# Patient Record
Sex: FEMALE | Race: WHITE | NOT HISPANIC OR LATINO | Employment: FULL TIME | URBAN - METROPOLITAN AREA
[De-identification: names, ages, dates, MRNs, and addresses within clinical notes are randomized per-mention and may not be internally consistent; named-entity substitution may affect disease eponyms.]

---

## 2017-06-08 ENCOUNTER — HOSPITAL ENCOUNTER (EMERGENCY)
Facility: HOSPITAL | Age: 22
Discharge: HOME | End: 2017-06-08
Attending: EMERGENCY MEDICINE | Admitting: EMERGENCY MEDICINE
Payer: COMMERCIAL

## 2017-06-08 VITALS
RESPIRATION RATE: 18 BRPM | HEART RATE: 70 BPM | WEIGHT: 185 LBS | OXYGEN SATURATION: 98 % | TEMPERATURE: 98.2 F | DIASTOLIC BLOOD PRESSURE: 79 MMHG | SYSTOLIC BLOOD PRESSURE: 111 MMHG
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
## 2017-06-08 DIAGNOSIS — M54.50 LOW BACK PAIN: Primary | ICD-10-CM

## 2017-06-08 PROCEDURE — 99284 EMERGENCY DEPT VISIT MOD MDM: CPT

## 2017-09-25 ENCOUNTER — ALLSCRIPTS OFFICE VISIT (OUTPATIENT)
Dept: OTHER | Facility: OTHER | Age: 22
End: 2017-09-25

## 2017-10-27 ENCOUNTER — ALLSCRIPTS OFFICE VISIT (OUTPATIENT)
Dept: OTHER | Facility: OTHER | Age: 22
End: 2017-10-27

## 2017-10-27 DIAGNOSIS — N92.6 IRREGULAR MENSTRUATION: ICD-10-CM

## 2017-10-29 LAB
CHLAMYDIA TRACHOMATIS BY MOL. METHOD (HISTORICAL): NOT DETECTED
GC BY MOL. METHOD (HISTORICAL): NOT DETECTED

## 2017-11-22 ENCOUNTER — APPOINTMENT (OUTPATIENT)
Dept: LAB | Facility: MEDICAL CENTER | Age: 22
End: 2017-11-22
Payer: COMMERCIAL

## 2017-11-22 ENCOUNTER — TRANSCRIBE ORDERS (OUTPATIENT)
Dept: ADMINISTRATIVE | Facility: HOSPITAL | Age: 22
End: 2017-11-22

## 2017-11-22 DIAGNOSIS — Z02.83 EMPLOYMENT-RELATED DRUG TESTING, ENCOUNTER FOR: Primary | ICD-10-CM

## 2017-11-22 DIAGNOSIS — Z02.83 EMPLOYMENT-RELATED DRUG TESTING, ENCOUNTER FOR: ICD-10-CM

## 2017-11-22 DIAGNOSIS — N92.6 IRREGULAR MENSTRUATION: ICD-10-CM

## 2017-11-22 LAB
GLUCOSE P FAST SERPL-MCNC: 75 MG/DL (ref 65–99)
TSH SERPL DL<=0.05 MIU/L-ACNC: 2.12 UIU/ML (ref 0.46–3.98)

## 2017-11-22 PROCEDURE — 84443 ASSAY THYROID STIM HORMONE: CPT

## 2017-11-22 PROCEDURE — 82947 ASSAY GLUCOSE BLOOD QUANT: CPT

## 2017-11-22 PROCEDURE — 36415 COLL VENOUS BLD VENIPUNCTURE: CPT

## 2017-11-22 PROCEDURE — 86480 TB TEST CELL IMMUN MEASURE: CPT

## 2017-11-25 LAB
ANNOTATION COMMENT IMP: NORMAL
GAMMA INTERFERON BACKGROUND BLD IA-ACNC: 0.05 IU/ML
M TB IFN-G BLD-IMP: NEGATIVE
M TB IFN-G CD4+ BCKGRND COR BLD-ACNC: 0 IU/ML
M TB IFN-G CD4+ T-CELLS BLD-ACNC: 0.05 IU/ML
MITOGEN IGNF BLD-ACNC: >10 IU/ML
QUANTIFERON-TB GOLD IN TUBE: NORMAL
SERVICE CMNT-IMP: NORMAL

## 2017-11-29 ENCOUNTER — HOSPITAL ENCOUNTER (OUTPATIENT)
Dept: RADIOLOGY | Facility: MEDICAL CENTER | Age: 22
Discharge: HOME | End: 2017-11-29
Payer: COMMERCIAL

## 2017-11-29 DIAGNOSIS — N92.6 IRREGULAR MENSTRUATION: ICD-10-CM

## 2017-11-29 PROCEDURE — 76830 TRANSVAGINAL US NON-OB: CPT

## 2017-11-29 PROCEDURE — 76856 US EXAM PELVIC COMPLETE: CPT

## 2018-01-03 ENCOUNTER — APPOINTMENT (OUTPATIENT)
Dept: LAB | Facility: MEDICAL CENTER | Age: 23
End: 2018-01-03
Attending: PHYSICIAN ASSISTANT
Payer: COMMERCIAL

## 2018-01-03 ENCOUNTER — TRANSCRIBE ORDERS (OUTPATIENT)
Dept: URGENT CARE | Facility: MEDICAL CENTER | Age: 23
End: 2018-01-03

## 2018-01-03 DIAGNOSIS — Z00.8 SPECIAL EXAM: ICD-10-CM

## 2018-01-03 DIAGNOSIS — Z00.8 SPECIAL EXAM: Primary | ICD-10-CM

## 2018-01-03 DIAGNOSIS — Z00.00 PHYSICAL EXAM: Primary | ICD-10-CM

## 2018-01-03 DIAGNOSIS — Z00.00 PHYSICAL EXAM: ICD-10-CM

## 2018-01-03 PROCEDURE — 36415 COLL VENOUS BLD VENIPUNCTURE: CPT

## 2018-01-03 PROCEDURE — 86787 VARICELLA-ZOSTER ANTIBODY: CPT

## 2018-01-04 LAB — VZV IGG SER IA-ACNC: NORMAL

## 2018-01-12 VITALS
BODY MASS INDEX: 33.15 KG/M2 | SYSTOLIC BLOOD PRESSURE: 122 MMHG | WEIGHT: 199 LBS | DIASTOLIC BLOOD PRESSURE: 68 MMHG | HEIGHT: 65 IN

## 2018-01-14 VITALS
SYSTOLIC BLOOD PRESSURE: 118 MMHG | BODY MASS INDEX: 33.36 KG/M2 | HEIGHT: 65 IN | DIASTOLIC BLOOD PRESSURE: 72 MMHG | WEIGHT: 200.2 LBS

## 2018-01-21 ENCOUNTER — GENERIC CONVERSION - ENCOUNTER (OUTPATIENT)
Dept: OTHER | Facility: OTHER | Age: 23
End: 2018-01-21

## 2018-01-24 NOTE — MISCELLANEOUS
Message  Msg left on pt cell that labs and u/s wnl   Will f/u at appt 3/18      Signatures   Electronically signed by : Kim Caro DO; Jan 21 2018 11:16AM EST                       (Author)

## 2018-10-19 ENCOUNTER — TRANSCRIBE ORDERS (OUTPATIENT)
Dept: ADMINISTRATIVE | Facility: HOSPITAL | Age: 23
End: 2018-10-19

## 2018-10-19 ENCOUNTER — APPOINTMENT (OUTPATIENT)
Dept: LAB | Facility: MEDICAL CENTER | Age: 23
End: 2018-10-19
Payer: COMMERCIAL

## 2018-10-19 ENCOUNTER — ANNUAL EXAM (OUTPATIENT)
Dept: OBGYN CLINIC | Facility: MEDICAL CENTER | Age: 23
End: 2018-10-19
Payer: COMMERCIAL

## 2018-10-19 VITALS
HEIGHT: 65 IN | DIASTOLIC BLOOD PRESSURE: 62 MMHG | BODY MASS INDEX: 33.89 KG/M2 | WEIGHT: 203.4 LBS | SYSTOLIC BLOOD PRESSURE: 98 MMHG

## 2018-10-19 DIAGNOSIS — D64.9 ANEMIA, UNSPECIFIED TYPE: Primary | ICD-10-CM

## 2018-10-19 DIAGNOSIS — Z11.3 SCREENING FOR STD (SEXUALLY TRANSMITTED DISEASE): ICD-10-CM

## 2018-10-19 DIAGNOSIS — Z02.1 PRE-EMPLOYMENT EXAMINATION: ICD-10-CM

## 2018-10-19 DIAGNOSIS — Z01.419 ENCOUNTER FOR GYNECOLOGICAL EXAMINATION (GENERAL) (ROUTINE) WITHOUT ABNORMAL FINDINGS: Primary | ICD-10-CM

## 2018-10-19 DIAGNOSIS — Z12.4 CERVICAL CANCER SCREENING: ICD-10-CM

## 2018-10-19 LAB — 25(OH)D3 SERPL-MCNC: 26.6 NG/ML (ref 30–100)

## 2018-10-19 PROCEDURE — 87491 CHLMYD TRACH DNA AMP PROBE: CPT | Performed by: OBSTETRICS & GYNECOLOGY

## 2018-10-19 PROCEDURE — 36415 COLL VENOUS BLD VENIPUNCTURE: CPT | Performed by: PEDIATRICS

## 2018-10-19 PROCEDURE — G0145 SCR C/V CYTO,THINLAYER,RESCR: HCPCS | Performed by: OBSTETRICS & GYNECOLOGY

## 2018-10-19 PROCEDURE — 87591 N.GONORRHOEAE DNA AMP PROB: CPT | Performed by: OBSTETRICS & GYNECOLOGY

## 2018-10-19 PROCEDURE — 86480 TB TEST CELL IMMUN MEASURE: CPT | Performed by: PEDIATRICS

## 2018-10-19 PROCEDURE — 82306 VITAMIN D 25 HYDROXY: CPT | Performed by: PEDIATRICS

## 2018-10-19 PROCEDURE — 99395 PREV VISIT EST AGE 18-39: CPT | Performed by: OBSTETRICS & GYNECOLOGY

## 2018-10-19 NOTE — PROGRESS NOTES
Assessment/Plan:    No problem-specific Assessment & Plan notes found for this encounter  Diagnoses and all orders for this visit:    Encounter for gynecological examination (general) (routine) without abnormal findings  -     Liquid-based pap, screening  -     Chlamydia/GC amplified DNA by PCR    Cervical cancer screening  -     Liquid-based pap, screening    Screening for STD (sexually transmitted disease)  -     Chlamydia/GC amplified DNA by PCR        Annual examination was completed  Pap was obtained  GC Chlamydia cultures were obtained after patient consented  We did discuss continuation of condoms for STI prevention as well as pregnancy prevention  Patient to return to the office in 1 year as necessary  She was cautioned to call if she goes greater than 4 months without menses  Subjective:      Patient ID: Shelley Padgett is a 26 y o  female  Patient returns for annual gyn visit  She has no new medical surgical issues  She continues with menses that are every 2-3 months and light in flow  She remains with same partner for the last 5 years  She uses condoms for contraception  She is completing some of her studies at [employer] and is hoping to get into the [facility] program       Gynecologic Exam         The following portions of the patient's history were reviewed and updated as appropriate:   She  has no past medical history on file  She   Patient Active Problem List    Diagnosis Date Noted    Encounter for gynecological examination (general) (routine) without abnormal findings 10/19/2018    Cervical cancer screening 10/19/2018    Screening for STD (sexually transmitted disease) 10/19/2018     She  has a past surgical history that includes Mouth surgery (Bilateral)  Her family history includes Anemia in her family; Breast cancer in her mother; Heart disease in her family; Other in her family  She  reports that she has never smoked   She has never used smokeless tobacco  She reports that she does not drink alcohol or use drugs  No current outpatient prescriptions on file  No current facility-administered medications for this visit  No current outpatient prescriptions on file prior to visit  No current facility-administered medications on file prior to visit  She is allergic to penicillins       Review of Systems   All other systems reviewed and are negative  Objective:      BP 98/62 (BP Location: Left arm, Patient Position: Sitting, Cuff Size: Standard)   Ht 5' 4 5" (1 638 m)   Wt 92 3 kg (203 lb 6 4 oz)   LMP 09/15/2018 (Exact Date)   BMI 34 37 kg/m²          Physical Exam   Constitutional: She is oriented to person, place, and time  She appears well-developed and well-nourished  HENT:   Head: Normocephalic and atraumatic  Nose: Nose normal    Eyes: Pupils are equal, round, and reactive to light  EOM are normal    Neck: Normal range of motion  Neck supple  No thyromegaly present  Cardiovascular: Normal rate and regular rhythm  Pulmonary/Chest: Effort normal and breath sounds normal  No respiratory distress  Breasts no masses, tenderness, skin changes   Abdominal: Soft  Bowel sounds are normal  She exhibits no distension and no mass  There is no tenderness  Hernia confirmed negative in the right inguinal area and confirmed negative in the left inguinal area  Genitourinary: Vagina normal and uterus normal  No breast swelling, tenderness, discharge or bleeding  Pelvic exam was performed with patient supine  No labial fusion  There is no rash, tenderness, lesion or injury on the right labia  There is no rash, tenderness, lesion or injury on the left labia  Cervix exhibits no motion tenderness, no discharge and no friability     Genitourinary Comments: Ext genitalia nl female w/o lesions  Vag healthy without lesions or discharge  Cervix healthy w/o lesions or discharge  uterus nl size, NT, no mass  Adnexa NT, no mass Musculoskeletal: Normal range of motion  She exhibits no edema  Lymphadenopathy:        Right: No inguinal adenopathy present  Left: No inguinal adenopathy present  Neurological: She is alert and oriented to person, place, and time  She has normal reflexes  Skin: Skin is warm and dry  No rash noted  Psychiatric: She has a normal mood and affect  Her behavior is normal  Thought content normal    Nursing note and vitals reviewed

## 2018-10-22 LAB
CHLAMYDIA DNA CVX QL NAA+PROBE: NORMAL
GAMMA INTERFERON BACKGROUND BLD IA-ACNC: 0.06 IU/ML
M TB IFN-G BLD-IMP: NEGATIVE
M TB IFN-G CD4+ BCKGRND COR BLD-ACNC: -0.01 IU/ML
M TB IFN-G CD4+ BCKGRND COR BLD-ACNC: -0.03 IU/ML
MITOGEN IGNF BCKGRD COR BLD-ACNC: >10 IU/ML
N GONORRHOEA DNA GENITAL QL NAA+PROBE: NORMAL

## 2018-10-23 ENCOUNTER — TELEPHONE (OUTPATIENT)
Dept: OBGYN CLINIC | Facility: CLINIC | Age: 23
End: 2018-10-23

## 2018-10-23 NOTE — TELEPHONE ENCOUNTER
----- Message from Dickson Dutton DO sent at 10/23/2018  6:07 PM EDT -----  Results within normal limits  Please inform patient

## 2018-10-26 LAB
LAB AP GYN PRIMARY INTERPRETATION: NORMAL
LAB AP LMP: NORMAL
Lab: NORMAL

## 2019-04-01 ENCOUNTER — TRANSCRIBE ORDERS (OUTPATIENT)
Dept: ADMINISTRATIVE | Facility: HOSPITAL | Age: 24
End: 2019-04-01

## 2019-04-01 DIAGNOSIS — M54.2 NECK PAIN: Primary | ICD-10-CM

## 2019-04-01 DIAGNOSIS — R42 DIZZINESS: ICD-10-CM

## 2019-04-02 ENCOUNTER — TRANSCRIBE ORDERS (OUTPATIENT)
Dept: ADMINISTRATIVE | Facility: HOSPITAL | Age: 24
End: 2019-04-02

## 2019-04-02 ENCOUNTER — APPOINTMENT (OUTPATIENT)
Dept: LAB | Facility: MEDICAL CENTER | Age: 24
End: 2019-04-02
Payer: COMMERCIAL

## 2019-04-02 DIAGNOSIS — E78.5 HYPERLIPIDEMIA, UNSPECIFIED HYPERLIPIDEMIA TYPE: ICD-10-CM

## 2019-04-02 DIAGNOSIS — E55.9 VITAMIN D DEFICIENCY: ICD-10-CM

## 2019-04-02 DIAGNOSIS — D64.9 ANEMIA, UNSPECIFIED TYPE: ICD-10-CM

## 2019-04-02 DIAGNOSIS — D64.9 ANEMIA, UNSPECIFIED TYPE: Primary | ICD-10-CM

## 2019-04-02 LAB
ALBUMIN SERPL BCP-MCNC: 4.4 G/DL (ref 3.5–5)
ALP SERPL-CCNC: 59 U/L (ref 46–116)
ALT SERPL W P-5'-P-CCNC: 21 U/L (ref 12–78)
ANION GAP SERPL CALCULATED.3IONS-SCNC: 4 MMOL/L (ref 4–13)
AST SERPL W P-5'-P-CCNC: 16 U/L (ref 5–45)
BILIRUB SERPL-MCNC: 0.65 MG/DL (ref 0.2–1)
BUN SERPL-MCNC: 16 MG/DL (ref 5–25)
CALCIUM SERPL-MCNC: 9.4 MG/DL (ref 8.3–10.1)
CHLORIDE SERPL-SCNC: 106 MMOL/L (ref 100–108)
CHOLEST SERPL-MCNC: 144 MG/DL (ref 50–200)
CO2 SERPL-SCNC: 28 MMOL/L (ref 21–32)
CREAT SERPL-MCNC: 0.95 MG/DL (ref 0.6–1.3)
EST. AVERAGE GLUCOSE BLD GHB EST-MCNC: 105 MG/DL
GFR SERPL CREATININE-BSD FRML MDRD: 86 ML/MIN/1.73SQ M
GLUCOSE P FAST SERPL-MCNC: 83 MG/DL (ref 65–99)
HBA1C MFR BLD: 5.3 % (ref 4.2–6.3)
HDLC SERPL-MCNC: 47 MG/DL (ref 40–60)
LDLC SERPL CALC-MCNC: 84 MG/DL (ref 0–100)
NONHDLC SERPL-MCNC: 97 MG/DL
POTASSIUM SERPL-SCNC: 4.2 MMOL/L (ref 3.5–5.3)
PROT SERPL-MCNC: 7.6 G/DL (ref 6.4–8.2)
SODIUM SERPL-SCNC: 138 MMOL/L (ref 136–145)
TIBC SERPL-MCNC: 409 UG/DL (ref 250–450)
TRIGL SERPL-MCNC: 65 MG/DL
TSH SERPL DL<=0.05 MIU/L-ACNC: 2.22 UIU/ML (ref 0.36–3.74)
VIT B12 SERPL-MCNC: 314 PG/ML (ref 100–900)

## 2019-04-02 PROCEDURE — 36415 COLL VENOUS BLD VENIPUNCTURE: CPT | Performed by: PEDIATRICS

## 2019-04-02 PROCEDURE — 83036 HEMOGLOBIN GLYCOSYLATED A1C: CPT | Performed by: PEDIATRICS

## 2019-04-02 PROCEDURE — 84443 ASSAY THYROID STIM HORMONE: CPT | Performed by: PEDIATRICS

## 2019-04-02 PROCEDURE — 82607 VITAMIN B-12: CPT | Performed by: PEDIATRICS

## 2019-04-02 PROCEDURE — 80053 COMPREHEN METABOLIC PANEL: CPT | Performed by: PEDIATRICS

## 2019-04-02 PROCEDURE — 80061 LIPID PANEL: CPT | Performed by: PEDIATRICS

## 2019-04-02 PROCEDURE — 86376 MICROSOMAL ANTIBODY EACH: CPT

## 2019-04-02 PROCEDURE — 83550 IRON BINDING TEST: CPT

## 2019-04-03 LAB — THYROPEROXIDASE AB SERPL-ACNC: 9 IU/ML (ref 0–34)

## 2019-04-07 ENCOUNTER — HOSPITAL ENCOUNTER (OUTPATIENT)
Dept: RADIOLOGY | Facility: HOSPITAL | Age: 24
Discharge: HOME | End: 2019-04-07
Payer: COMMERCIAL

## 2019-04-07 DIAGNOSIS — R42 DIZZINESS: ICD-10-CM

## 2019-04-07 DIAGNOSIS — M54.2 NECK PAIN: ICD-10-CM

## 2019-04-07 PROCEDURE — 72141 MRI NECK SPINE W/O DYE: CPT

## 2019-07-19 ENCOUNTER — HOSPITAL ENCOUNTER (EMERGENCY)
Facility: HOSPITAL | Age: 24
Discharge: HOME | End: 2019-07-19
Attending: EMERGENCY MEDICINE | Admitting: EMERGENCY MEDICINE
Payer: OTHER MISCELLANEOUS

## 2019-07-19 VITALS
OXYGEN SATURATION: 98 % | BODY MASS INDEX: 32.11 KG/M2 | DIASTOLIC BLOOD PRESSURE: 71 MMHG | WEIGHT: 190 LBS | RESPIRATION RATE: 16 BRPM | SYSTOLIC BLOOD PRESSURE: 124 MMHG | HEART RATE: 72 BPM

## 2019-07-19 DIAGNOSIS — M79.604 RIGHT LEG PAIN: Primary | ICD-10-CM

## 2019-07-19 PROCEDURE — 99283 EMERGENCY DEPT VISIT LOW MDM: CPT

## 2019-07-19 PROCEDURE — 99281 EMR DPT VST MAYX REQ PHY/QHP: CPT | Performed by: PHYSICIAN ASSISTANT

## 2019-07-19 RX ORDER — IBUPROFEN 600 MG/1
TABLET ORAL EVERY 6 HOURS PRN
COMMUNITY
End: 2020-10-27 | Stop reason: ALTCHOICE

## 2019-07-19 NOTE — ED PROVIDER NOTES
History  Chief Complaint   Patient presents with    Leg Pain     Pt reports running after a patient this am while at work and feeling pain in her upper right leg  Pt denies numbness/tingling/radiating pain  Pt reports she took ibuprofen for pain relief at approx 82      82-year-old female with no significant past history presents for evaluation of right-sided hamstring pain that started earlier today  Patient reports that she was running after a patient in states that when she ran she noticed a pulled the back of her leg  Patient states that she took ibuprofen earlier today with relief  Currently reports that she has pain when she stands  Does not have pain with palpation  Denies any swelling, fall, injury  Has not fracture injured this in the past   Denies any paresthesias or pain radiating down her leg  Prior to Admission Medications   Prescriptions Last Dose Informant Patient Reported? Taking?   ibuprofen (MOTRIN) 600 mg tablet 7/19/2019 at 1000 Self Yes Yes   Sig: Take by mouth every 6 (six) hours as needed for mild pain      Facility-Administered Medications: None       History reviewed  No pertinent past medical history  Past Surgical History:   Procedure Laterality Date    MOUTH SURGERY Bilateral     B[place] teeth at age 5       Family History   Problem Relation Age of Onset    Breast cancer Mother     Anemia Family     Other Family         Blood Dyscrasia    Heart disease Family         Cardiac Disorder     I have reviewed and agree with the history as documented  Social History     Tobacco Use    Smoking status: Never Smoker    Smokeless tobacco: Never Used   Substance Use Topics    Alcohol use: No    Drug use: No        Review of Systems   Constitutional: Negative for chills and fever  Gastrointestinal: Negative for nausea and vomiting  Musculoskeletal: Positive for myalgias  Negative for arthralgias  Skin: Negative for color change, pallor, rash and wound  Neurological: Negative for weakness and numbness  Physical Exam  Physical Exam   Constitutional: She appears well-developed and well-nourished  No distress  Musculoskeletal: Normal range of motion  She exhibits tenderness  She exhibits no edema or deformity  Right upper leg: She exhibits tenderness  She exhibits no bony tenderness, no swelling, no edema, no deformity and no laceration  Legs:  Full range of motion of lower extremities bilaterally, neurovascular intact  Neurological: She is alert  Skin: Skin is warm  Capillary refill takes less than 2 seconds  No rash noted  She is not diaphoretic  No erythema  Psychiatric: She has a normal mood and affect  Vital Signs  ED Triage Vitals [07/19/19 1525]   Temp Pulse Respirations Blood Pressure SpO2   -- 72 16 124/71 98 %      Temp src Heart Rate Source Patient Position - Orthostatic VS BP Location FiO2 (%)   -- Monitor Sitting Right arm --      Pain Score       3           Vitals:    07/19/19 1525   BP: 124/71   Pulse: 72   Patient Position - Orthostatic VS: Sitting         Visual Acuity      ED Medications  Medications - No data to display    Diagnostic Studies  Results Reviewed     None                 No orders to display              Procedures  Procedures       ED Course                               MDM  Number of Diagnoses or Management Options  Right leg pain:   Diagnosis management comments: 70-year-old female presents for evaluation of right-sided leg pain  Patient reports that over time pain has subsided and is improving  Will advise ice packs, anti-inflammatories for pain control        Disposition  Final diagnoses:   Right leg pain     Time reflects when diagnosis was documented in both MDM as applicable and the Disposition within this note     Time User Action Codes Description Comment    7/19/2019  4:08 PM Fabián Billings Add [M79 [id number]] Right leg pain       ED Disposition     ED Disposition Condition Date/Time Comment Discharge Stable Fri Jul 19, 2019  4:08 PM Germaine Mensah discharge to home/self care  Follow-up Information     Follow up With Specialties Details Why Contact Info    Keely Lara, DO Pediatrics  As needed [address] [facility]  [address] [phone]          Discharge Medication List as of 7/19/2019  4:09 PM      CONTINUE these medications which have NOT CHANGED    Details   ibuprofen (MOTRIN) 600 mg tablet Take by mouth every 6 (six) hours as needed for mild pain, Historical Med           No discharge procedures on file      ED Provider  Electronically Signed by           Ronel Joshi PA-C  07/19/19 1973

## 2019-10-25 ENCOUNTER — ANNUAL EXAM (OUTPATIENT)
Dept: OBGYN CLINIC | Facility: MEDICAL CENTER | Age: 24
End: 2019-10-25
Payer: COMMERCIAL

## 2019-10-25 VITALS
DIASTOLIC BLOOD PRESSURE: 92 MMHG | SYSTOLIC BLOOD PRESSURE: 115 MMHG | WEIGHT: 203.2 LBS | HEIGHT: 65 IN | BODY MASS INDEX: 33.85 KG/M2

## 2019-10-25 DIAGNOSIS — Z01.419 ENCOUNTER FOR GYNECOLOGICAL EXAMINATION (GENERAL) (ROUTINE) WITHOUT ABNORMAL FINDINGS: Primary | ICD-10-CM

## 2019-10-25 DIAGNOSIS — N94.6 DYSMENORRHEA: ICD-10-CM

## 2019-10-25 PROCEDURE — 99395 PREV VISIT EST AGE 18-39: CPT | Performed by: OBSTETRICS & GYNECOLOGY

## 2019-10-25 RX ORDER — NORETHINDRONE ACETATE AND ETHINYL ESTRADIOL AND FERROUS FUMARATE 1MG-20(24)
1 KIT ORAL DAILY
Qty: 84 TABLET | Refills: 3 | Status: SHIPPED | OUTPATIENT
Start: 2019-10-25 | End: 2020-10-27 | Stop reason: SDUPTHER

## 2019-10-25 NOTE — PROGRESS NOTES
Assessment/Plan:    No problem-specific Assessment & Plan notes found for this encounter  Diagnoses and all orders for this visit:    Encounter for gynecological examination (general) (routine) without abnormal findings    Dysmenorrhea  -     norethindrone-ethinyl estradiol-ferrous fumarate (LOESTIN 24 FE) 1-20 MG-MCG(24) per tablet; Take 1 tablet by mouth daily        Annual examination was completed  Patient declined endocervical cultures  We discussed strategies for managing her dysmenorrhea  Patient is receptive to a low-dose OCP  We did discuss the pros and cons as well as risks and benefits  Patient will initiate the Sunday after next menses  She was cautioned that she would need use condoms for contraception  Patient will call if she has not seen dramatic benefit in the next 3 months otherwise return to the office in 1 year  If she does see no benefit a pelvic ultrasound would be warranted  Subjective:      Patient ID: Mateus Dave is a 27 y o  female  Patient returns for annual gyn visit  Since she was last seen patient relates menses that have been somewhat irregular  Patient mostly bothered by significant painful cramps with onset of menses  Patient interested in solutions for this issue  She has been using naproxen without benefit  Patient no longer in a sexual relationship  She works as a medical assistant at [employer]  The following portions of the patient's history were reviewed and updated as appropriate:   She  has no past medical history on file  She   Patient Active Problem List    Diagnosis Date Noted    Dysmenorrhea 10/25/2019    Encounter for gynecological examination (general) (routine) without abnormal findings 10/19/2018    Cervical cancer screening 10/19/2018    Screening for STD (sexually transmitted disease) 10/19/2018     She  has a past surgical history that includes Mouth surgery (Bilateral)    Her family history includes Anemia in her family; Breast cancer in her mother; Heart disease in her family; Other in her family  She  reports that she has never smoked  She has never used smokeless tobacco  She reports that she drinks alcohol  She reports that she does not use drugs  Current Outpatient Medications   Medication Sig Dispense Refill    ibuprofen (MOTRIN) 600 mg tablet Take by mouth every 6 (six) hours as needed for mild pain      norethindrone-ethinyl estradiol-ferrous fumarate (LOESTIN 24 FE) 1-20 MG-MCG(24) per tablet Take 1 tablet by mouth daily 84 tablet 3     No current facility-administered medications for this visit  Current Outpatient Medications on File Prior to Visit   Medication Sig    ibuprofen (MOTRIN) 600 mg tablet Take by mouth every 6 (six) hours as needed for mild pain     No current facility-administered medications on file prior to visit  She is allergic to penicillins       Review of Systems   All other systems reviewed and are negative  Objective:      /92 (BP Location: Left arm, Patient Position: Sitting, Cuff Size: Standard)   Ht 5' 4 5" (1 638 m)   Wt 92 2 kg (203 lb 3 2 oz)   LMP 10/12/2019 (Exact Date)   BMI 34 34 kg/m²          Physical Exam   Constitutional: She is oriented to person, place, and time  She appears well-developed and well-nourished  HENT:   Head: Normocephalic and atraumatic  Nose: Nose normal    Eyes: Pupils are equal, round, and reactive to light  EOM are normal    Neck: Normal range of motion  Neck supple  No thyromegaly present  Pulmonary/Chest: Effort normal  No respiratory distress  No breast tenderness, discharge or bleeding  Breasts no masses, tenderness, skin changes   Abdominal: Soft  She exhibits no distension and no mass  There is no tenderness  Hernia confirmed negative in the right inguinal area and confirmed negative in the left inguinal area  Genitourinary: Vagina normal and uterus normal  No breast tenderness, discharge or bleeding  Pelvic exam was performed with patient supine  No labial fusion  There is no rash, tenderness, lesion or injury on the right labia  There is no rash, tenderness, lesion or injury on the left labia  Cervix exhibits no motion tenderness, no discharge and no friability  Genitourinary Comments: Ext genitalia nl female w/o lesions  Vag healthy without lesions or discharge  Cervix healthy w/o lesions or discharge  uterus nl size, NT, no mass  Adnexa NT, no mass   Musculoskeletal: Normal range of motion  She exhibits no edema  Lymphadenopathy:        Right: No inguinal adenopathy present  Left: No inguinal adenopathy present  Neurological: She is alert and oriented to person, place, and time  She has normal reflexes  Skin: Skin is warm and dry  No rash noted  Psychiatric: She has a normal mood and affect  Her behavior is normal  Thought content normal    Nursing note and vitals reviewed

## 2019-12-06 ENCOUNTER — TELEPHONE (OUTPATIENT)
Dept: OBGYN CLINIC | Facility: CLINIC | Age: 24
End: 2019-12-06

## 2019-12-06 NOTE — TELEPHONE ENCOUNTER
Pt returned call- informed pt decrease appetitie can be a side effect of starting birth control  Advised pt to monitor for now  Pt denies N & V- is able to tolerate foods  Pt to call if symptoms worsen or do not improve over time

## 2019-12-06 NOTE — TELEPHONE ENCOUNTER
----- Message from [facility] Galvan sent at 12/6/2019  3:02 PM EST -----  Regarding: Non-Urgent Medical Question  Contact: [phone]  Dr Gurdeep Geronimo,   I started the birth control two weeks ago on November 24th, and since starting, I have not had much of an appetite, can this be a side effect of the birth control  I had asked on the doctors I work with, Dr Irvin Raygoza, and she said to ask you because she was not aware of this being a side effect

## 2019-12-29 ENCOUNTER — HOSPITAL ENCOUNTER (EMERGENCY)
Facility: HOSPITAL | Age: 24
Discharge: HOME | End: 2019-12-29
Attending: EMERGENCY MEDICINE
Payer: COMMERCIAL

## 2019-12-29 VITALS
WEIGHT: 205 LBS | TEMPERATURE: 98.5 F | HEIGHT: 65 IN | SYSTOLIC BLOOD PRESSURE: 121 MMHG | DIASTOLIC BLOOD PRESSURE: 82 MMHG | OXYGEN SATURATION: 97 % | BODY MASS INDEX: 34.16 KG/M2 | RESPIRATION RATE: 18 BRPM | HEART RATE: 80 BPM

## 2019-12-29 VITALS
HEIGHT: 65 IN | BODY MASS INDEX: 35.34 KG/M2 | RESPIRATION RATE: 18 BRPM | OXYGEN SATURATION: 98 % | WEIGHT: 212.08 LBS | SYSTOLIC BLOOD PRESSURE: 117 MMHG | HEART RATE: 112 BPM | DIASTOLIC BLOOD PRESSURE: 78 MMHG

## 2019-12-29 DIAGNOSIS — T74.21XA SEXUAL ASSAULT OF ADULT: Primary | ICD-10-CM

## 2019-12-29 DIAGNOSIS — T74.21XA SEXUAL ASSAULT OF ADULT, INITIAL ENCOUNTER: Primary | ICD-10-CM

## 2019-12-29 LAB
ALBUMIN SERPL BCP-MCNC: 4.5 G/DL (ref 3.5–5)
ALP SERPL-CCNC: 58 U/L (ref 46–116)
ALT SERPL W P-5'-P-CCNC: 25 U/L (ref 12–78)
AMPHETAMINES SERPL QL SCN: NEGATIVE
ANION GAP SERPL CALCULATED.3IONS-SCNC: 12 MMOL/L (ref 4–13)
APAP SERPL-MCNC: <2 UG/ML (ref 10–20)
AST SERPL W P-5'-P-CCNC: 22 U/L (ref 5–45)
BACTERIA UR QL AUTO: ABNORMAL /HPF
BARBITURATES UR QL: NEGATIVE
BASOPHILS # BLD AUTO: 0.06 THOUSANDS/ΜL (ref 0–0.1)
BASOPHILS NFR BLD AUTO: 1 % (ref 0–1)
BENZODIAZ UR QL: NEGATIVE
BILIRUB SERPL-MCNC: 0.2 MG/DL (ref 0.2–1)
BILIRUB UR QL STRIP: NEGATIVE
BUN SERPL-MCNC: 13 MG/DL (ref 5–25)
CALCIUM SERPL-MCNC: 9.5 MG/DL (ref 8.3–10.1)
CHLORIDE SERPL-SCNC: 106 MMOL/L (ref 100–108)
CLARITY UR: CLEAR
CO2 SERPL-SCNC: 25 MMOL/L (ref 21–32)
COCAINE UR QL: NEGATIVE
COLOR UR: YELLOW
CREAT SERPL-MCNC: 0.76 MG/DL (ref 0.6–1.3)
EOSINOPHIL # BLD AUTO: 0.09 THOUSAND/ΜL (ref 0–0.61)
EOSINOPHIL NFR BLD AUTO: 1 % (ref 0–6)
ERYTHROCYTE [DISTWIDTH] IN BLOOD BY AUTOMATED COUNT: 12.3 % (ref 11.6–15.1)
ETHANOL SERPL-MCNC: <3 MG/DL (ref 0–3)
EXT PREG TEST URINE: NEGATIVE
EXT. CONTROL ED NAV: NORMAL
GFR SERPL CREATININE-BSD FRML MDRD: 112 ML/MIN/1.73SQ M
GLUCOSE SERPL-MCNC: 72 MG/DL (ref 65–140)
GLUCOSE UR STRIP-MCNC: NEGATIVE MG/DL
HCT VFR BLD AUTO: 43.5 % (ref 34.8–46.1)
HGB BLD-MCNC: 14.4 G/DL (ref 11.5–15.4)
HGB UR QL STRIP.AUTO: ABNORMAL
HIV 1+2 AB+HIV1 P24 AG SERPL QL IA: NORMAL
HIV1 P24 AG SER QL: NORMAL
HYALINE CASTS #/AREA URNS LPF: ABNORMAL /LPF
IMM GRANULOCYTES # BLD AUTO: 0.04 THOUSAND/UL (ref 0–0.2)
IMM GRANULOCYTES NFR BLD AUTO: 0 % (ref 0–2)
KETONES UR STRIP-MCNC: NEGATIVE MG/DL
LEUKOCYTE ESTERASE UR QL STRIP: NEGATIVE
LYMPHOCYTES # BLD AUTO: 3.3 THOUSANDS/ΜL (ref 0.6–4.47)
LYMPHOCYTES NFR BLD AUTO: 26 % (ref 14–44)
MCH RBC QN AUTO: 30.4 PG (ref 26.8–34.3)
MCHC RBC AUTO-ENTMCNC: 33.1 G/DL (ref 31.4–37.4)
MCV RBC AUTO: 92 FL (ref 82–98)
METHADONE UR QL: NEGATIVE
MONOCYTES # BLD AUTO: 0.66 THOUSAND/ΜL (ref 0.17–1.22)
MONOCYTES NFR BLD AUTO: 5 % (ref 4–12)
NEUTROPHILS # BLD AUTO: 8.39 THOUSANDS/ΜL (ref 1.85–7.62)
NEUTS SEG NFR BLD AUTO: 67 % (ref 43–75)
NITRITE UR QL STRIP: NEGATIVE
NON-SQ EPI CELLS URNS QL MICRO: ABNORMAL /HPF
NRBC BLD AUTO-RTO: 0 /100 WBCS
OPIATES UR QL SCN: NEGATIVE
PCP UR QL: NEGATIVE
PH UR STRIP.AUTO: 5.5 [PH]
PLATELET # BLD AUTO: 395 THOUSANDS/UL (ref 149–390)
PMV BLD AUTO: 9.2 FL (ref 8.9–12.7)
POTASSIUM SERPL-SCNC: 4.5 MMOL/L (ref 3.5–5.3)
PROT SERPL-MCNC: 8.4 G/DL (ref 6.4–8.2)
PROT UR STRIP-MCNC: NEGATIVE MG/DL
RBC # BLD AUTO: 4.73 MILLION/UL (ref 3.81–5.12)
RBC #/AREA URNS AUTO: ABNORMAL /HPF
SALICYLATES SERPL-MCNC: <3 MG/DL (ref 3–20)
SODIUM SERPL-SCNC: 143 MMOL/L (ref 136–145)
SP GR UR STRIP.AUTO: 1.02 (ref 1–1.03)
THC UR QL: NEGATIVE
UROBILINOGEN UR QL STRIP.AUTO: 0.2 E.U./DL
WBC # BLD AUTO: 12.54 THOUSAND/UL (ref 4.31–10.16)
WBC #/AREA URNS AUTO: ABNORMAL /HPF

## 2019-12-29 PROCEDURE — 81025 URINE PREGNANCY TEST: CPT | Performed by: EMERGENCY MEDICINE

## 2019-12-29 PROCEDURE — 85025 COMPLETE CBC W/AUTO DIFF WBC: CPT | Performed by: EMERGENCY MEDICINE

## 2019-12-29 PROCEDURE — 81001 URINALYSIS AUTO W/SCOPE: CPT | Performed by: EMERGENCY MEDICINE

## 2019-12-29 PROCEDURE — 80307 DRUG TEST PRSMV CHEM ANLYZR: CPT | Performed by: EMERGENCY MEDICINE

## 2019-12-29 PROCEDURE — 36415 COLL VENOUS BLD VENIPUNCTURE: CPT | Performed by: EMERGENCY MEDICINE

## 2019-12-29 PROCEDURE — 80053 COMPREHEN METABOLIC PANEL: CPT | Performed by: EMERGENCY MEDICINE

## 2019-12-29 PROCEDURE — 99284 EMERGENCY DEPT VISIT MOD MDM: CPT | Performed by: EMERGENCY MEDICINE

## 2019-12-29 PROCEDURE — 87491 CHLMYD TRACH DNA AMP PROBE: CPT | Performed by: EMERGENCY MEDICINE

## 2019-12-29 PROCEDURE — 99282 EMERGENCY DEPT VISIT SF MDM: CPT | Performed by: EMERGENCY MEDICINE

## 2019-12-29 PROCEDURE — 96374 THER/PROPH/DIAG INJ IV PUSH: CPT

## 2019-12-29 PROCEDURE — 80329 ANALGESICS NON-OPIOID 1 OR 2: CPT | Performed by: EMERGENCY MEDICINE

## 2019-12-29 PROCEDURE — 87806 HIV AG W/HIV1&2 ANTB W/OPTIC: CPT | Performed by: EMERGENCY MEDICINE

## 2019-12-29 PROCEDURE — 80320 DRUG SCREEN QUANTALCOHOLS: CPT | Performed by: EMERGENCY MEDICINE

## 2019-12-29 PROCEDURE — 99284 EMERGENCY DEPT VISIT MOD MDM: CPT

## 2019-12-29 PROCEDURE — 87591 N.GONORRHOEAE DNA AMP PROB: CPT | Performed by: EMERGENCY MEDICINE

## 2019-12-29 PROCEDURE — 86592 SYPHILIS TEST NON-TREP QUAL: CPT | Performed by: EMERGENCY MEDICINE

## 2019-12-29 RX ORDER — LEVONORGESTREL 1.5 MG/1
1.5 TABLET ORAL ONCE
Status: COMPLETED | OUTPATIENT
Start: 2019-12-29 | End: 2019-12-29

## 2019-12-29 RX ORDER — AZITHROMYCIN 250 MG/1
2000 TABLET, FILM COATED ORAL ONCE
Status: COMPLETED | OUTPATIENT
Start: 2019-12-29 | End: 2019-12-29

## 2019-12-29 RX ORDER — ONDANSETRON 2 MG/ML
4 INJECTION INTRAMUSCULAR; INTRAVENOUS ONCE
Status: COMPLETED | OUTPATIENT
Start: 2019-12-29 | End: 2019-12-29

## 2019-12-29 RX ORDER — METRONIDAZOLE 500 MG/1
2000 TABLET ORAL ONCE
Status: COMPLETED | OUTPATIENT
Start: 2019-12-29 | End: 2019-12-29

## 2019-12-29 RX ORDER — LEVONORGESTREL 1.5 MG/1
3 TABLET ORAL ONCE
Status: DISCONTINUED | OUTPATIENT
Start: 2019-12-29 | End: 2019-12-29

## 2019-12-29 RX ADMIN — ONDANSETRON 4 MG: 2 INJECTION INTRAMUSCULAR; INTRAVENOUS at 12:15

## 2019-12-29 RX ADMIN — AZITHROMYCIN 2000 MG: 250 TABLET, FILM COATED ORAL at 15:04

## 2019-12-29 RX ADMIN — LEVONORGESTREL 1.5 MG: 1.5 TABLET ORAL at 15:09

## 2019-12-29 RX ADMIN — METRONIDAZOLE 2000 MG: 500 TABLET ORAL at 15:05
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
## 2019-12-30 LAB — RPR SER QL: NORMAL

## 2019-12-31 LAB
C TRACH DNA SPEC QL NAA+PROBE: NEGATIVE
N GONORRHOEA DNA SPEC QL NAA+PROBE: NEGATIVE

## 2020-01-10 ENCOUNTER — OFFICE VISIT (OUTPATIENT)
Dept: OBGYN CLINIC | Facility: CLINIC | Age: 25
End: 2020-01-10
Payer: COMMERCIAL

## 2020-01-10 VITALS — WEIGHT: 208.6 LBS | DIASTOLIC BLOOD PRESSURE: 60 MMHG | BODY MASS INDEX: 34.71 KG/M2 | SYSTOLIC BLOOD PRESSURE: 118 MMHG

## 2020-01-10 DIAGNOSIS — Z11.3 SCREENING FOR STD (SEXUALLY TRANSMITTED DISEASE): ICD-10-CM

## 2020-01-10 DIAGNOSIS — T74.21XD SEXUAL ASSAULT OF ADULT, SUBSEQUENT ENCOUNTER: Primary | ICD-10-CM

## 2020-01-10 PROBLEM — T74.21XA SEXUAL ASSAULT OF ADULT: Status: ACTIVE | Noted: 2020-01-10

## 2020-01-10 LAB
C TRACH DNA SPEC QL NAA+PROBE: NEGATIVE
N GONORRHOEA DNA SPEC QL NAA+PROBE: NEGATIVE

## 2020-01-10 PROCEDURE — 87591 N.GONORRHOEAE DNA AMP PROB: CPT | Performed by: NURSE PRACTITIONER

## 2020-01-10 PROCEDURE — 99214 OFFICE O/P EST MOD 30 MIN: CPT | Performed by: NURSE PRACTITIONER

## 2020-01-10 PROCEDURE — 87491 CHLMYD TRACH DNA AMP PROBE: CPT | Performed by: NURSE PRACTITIONER
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
## 2020-01-10 NOTE — ASSESSMENT & PLAN NOTE
STI testing collected and sent  Patient desires to continue with OCP  Emotional support provided and recommended patient continue with counseling support  Patient will RTO for already scheduled annual gyn exam or sooner prn

## 2020-01-13 ENCOUNTER — TELEPHONE (OUTPATIENT)
Dept: OBGYN CLINIC | Facility: CLINIC | Age: 25
End: 2020-01-13

## 2020-01-13 NOTE — TELEPHONE ENCOUNTER
----- Message from [address] [place] [address] sent at 1/13/2020  9:47 AM EST -----  Please notify patient Gc/Chlamydia are negative  Thank you!

## 2020-01-21 ENCOUNTER — APPOINTMENT (OUTPATIENT)
Dept: LAB | Facility: CLINIC | Age: 25
End: 2020-01-21

## 2020-01-21 DIAGNOSIS — Z11.3 SCREENING FOR STD (SEXUALLY TRANSMITTED DISEASE): ICD-10-CM

## 2020-01-21 LAB
HCV AB SER QL: NORMAL
RPR SER QL: NORMAL

## 2020-01-21 PROCEDURE — 87350 HEPATITIS BE AG IA: CPT

## 2020-01-21 PROCEDURE — 86803 HEPATITIS C AB TEST: CPT

## 2020-01-21 PROCEDURE — 86592 SYPHILIS TEST NON-TREP QUAL: CPT

## 2020-01-21 PROCEDURE — 36415 COLL VENOUS BLD VENIPUNCTURE: CPT

## 2020-01-21 PROCEDURE — 87389 HIV-1 AG W/HIV-1&-2 AB AG IA: CPT

## 2020-01-22 LAB
HBV E AG SERPL QL IA: NEGATIVE
HIV 1+2 AB+HIV1 P24 AG SERPL QL IA: NORMAL

## 2020-01-23 ENCOUNTER — TELEPHONE (OUTPATIENT)
Dept: OBGYN CLINIC | Facility: CLINIC | Age: 25
End: 2020-01-23

## 2020-01-23 NOTE — TELEPHONE ENCOUNTER
----- Message from [address] [place] [address] sent at 1/23/2020 10:44 AM EST -----  Notify STI blood work panel negative  Thank you!

## 2020-02-25 ENCOUNTER — TELEPHONE (OUTPATIENT)
Dept: OBGYN CLINIC | Facility: CLINIC | Age: 25
End: 2020-02-25

## 2020-02-25 NOTE — TELEPHONE ENCOUNTER
Pt of rk, originally began loestrin fe 1/20   10/25/19  Yearly 1/10/20 with Rogerio Schlatter  She has been ill and under great stress   On second active week of the pill and now has cycle  encouraged  To continue pill and keep calendar  Also adv ibf 600-800mg q6h with food

## 2020-02-25 NOTE — TELEPHONE ENCOUNTER
Pt has been on this pill since Oct  2019- had period 2/14 and got it again today - seems heavier than it has been   Has not missed any pills-takes every day @ 7am

## 2020-10-27 ENCOUNTER — ANNUAL EXAM (OUTPATIENT)
Dept: OBGYN CLINIC | Facility: CLINIC | Age: 25
End: 2020-10-27
Payer: COMMERCIAL

## 2020-10-27 VITALS
WEIGHT: 198.6 LBS | HEIGHT: 65 IN | TEMPERATURE: 97.3 F | SYSTOLIC BLOOD PRESSURE: 108 MMHG | DIASTOLIC BLOOD PRESSURE: 68 MMHG | BODY MASS INDEX: 33.09 KG/M2

## 2020-10-27 DIAGNOSIS — Z01.419 ENCOUNTER FOR GYNECOLOGICAL EXAMINATION (GENERAL) (ROUTINE) WITHOUT ABNORMAL FINDINGS: Primary | ICD-10-CM

## 2020-10-27 DIAGNOSIS — N94.6 DYSMENORRHEA: ICD-10-CM

## 2020-10-27 DIAGNOSIS — Z30.41 ORAL CONTRACEPTIVE PILL SURVEILLANCE: ICD-10-CM

## 2020-10-27 PROCEDURE — 99395 PREV VISIT EST AGE 18-39: CPT | Performed by: NURSE PRACTITIONER

## 2020-10-27 RX ORDER — NORETHINDRONE ACETATE AND ETHINYL ESTRADIOL AND FERROUS FUMARATE 1MG-20(24)
1 KIT ORAL DAILY
Qty: 84 TABLET | Refills: 3 | Status: SHIPPED | OUTPATIENT
Start: 2020-10-27

## 2021-06-14 ENCOUNTER — TRANSCRIBE ORDERS (OUTPATIENT)
Dept: ADMINISTRATIVE | Facility: HOSPITAL | Age: 26
End: 2021-06-14

## 2021-06-15 ENCOUNTER — TRANSCRIBE ORDERS (OUTPATIENT)
Dept: ADMINISTRATIVE | Facility: HOSPITAL | Age: 26
End: 2021-06-15

## 2021-06-15 DIAGNOSIS — R22.42 MASS OF LEFT THIGH: Primary | ICD-10-CM

## 2021-06-16 ENCOUNTER — HOSPITAL ENCOUNTER (OUTPATIENT)
Dept: RADIOLOGY | Facility: HOSPITAL | Age: 26
Discharge: HOME | End: 2021-06-16
Attending: PEDIATRICS
Payer: COMMERCIAL

## 2021-06-16 DIAGNOSIS — R22.42 MASS OF LEFT THIGH: ICD-10-CM

## 2021-06-16 PROCEDURE — 76882 US LMTD JT/FCL EVL NVASC XTR: CPT

## 2021-06-17 ENCOUNTER — OFFICE VISIT (OUTPATIENT)
Dept: OBGYN CLINIC | Facility: HOSPITAL | Age: 26
End: 2021-06-17
Payer: COMMERCIAL

## 2021-06-17 VITALS
WEIGHT: 201.4 LBS | HEIGHT: 65 IN | BODY MASS INDEX: 33.55 KG/M2 | DIASTOLIC BLOOD PRESSURE: 81 MMHG | HEART RATE: 73 BPM | SYSTOLIC BLOOD PRESSURE: 123 MMHG

## 2021-06-17 DIAGNOSIS — T14.8XXA MOREL LAVALLEE LESION: Primary | ICD-10-CM

## 2021-06-17 PROCEDURE — 99203 OFFICE O/P NEW LOW 30 MIN: CPT | Performed by: ORTHOPAEDIC SURGERY

## 2021-06-17 RX ORDER — NAPROXEN SODIUM 550 MG/1
TABLET ORAL
COMMUNITY
Start: 2021-06-07
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
## 2021-06-18 ENCOUNTER — PREP FOR PROCEDURE (OUTPATIENT)
Dept: INTERVENTIONAL RADIOLOGY/VASCULAR | Facility: CLINIC | Age: 26
End: 2021-06-18

## 2021-06-18 DIAGNOSIS — T14.8XXA MOREL LAVALLEE LESION: Primary | ICD-10-CM

## 2021-06-21 ENCOUNTER — TELEPHONE (OUTPATIENT)
Dept: RADIOLOGY | Facility: HOSPITAL | Age: 26
End: 2021-06-21

## 2021-06-21 NOTE — PRE-PROCEDURE INSTRUCTIONS
Phone Consult completed:Pre procedure instructions for drainage of thigh fluid collection reviewed with verbal understanding  Allergies,meds,NPO, and ride  Approximate arrival time given,SDS phone call evening before procedure  Covid screening completed no vaccine

## 2021-06-23 ENCOUNTER — HOSPITAL ENCOUNTER (OUTPATIENT)
Dept: RADIOLOGY | Facility: HOSPITAL | Age: 26
Discharge: HOME | End: 2021-06-23
Attending: RADIOLOGY
Payer: COMMERCIAL

## 2021-06-23 VITALS
WEIGHT: 204 LBS | TEMPERATURE: 98.7 F | OXYGEN SATURATION: 99 % | BODY MASS INDEX: 34.83 KG/M2 | DIASTOLIC BLOOD PRESSURE: 74 MMHG | RESPIRATION RATE: 18 BRPM | SYSTOLIC BLOOD PRESSURE: 124 MMHG | HEART RATE: 76 BPM | HEIGHT: 64 IN

## 2021-06-23 DIAGNOSIS — T14.8XXA MOREL LAVALLEE LESION: ICD-10-CM

## 2021-06-23 PROCEDURE — 87205 SMEAR GRAM STAIN: CPT | Performed by: RADIOLOGY

## 2021-06-23 PROCEDURE — 10030 IMG GID FLU COLL DRG SFT TIS: CPT

## 2021-06-23 PROCEDURE — 87070 CULTURE OTHR SPECIMN AEROBIC: CPT | Performed by: RADIOLOGY

## 2021-06-23 PROCEDURE — 10160 PNXR ASPIR ABSC HMTMA BULLA: CPT | Performed by: RADIOLOGY

## 2021-06-23 PROCEDURE — 76942 ECHO GUIDE FOR BIOPSY: CPT | Performed by: RADIOLOGY

## 2021-06-23 RX ORDER — LIDOCAINE WITH 8.4% SOD BICARB 0.9%(10ML)
SYRINGE (ML) INJECTION CODE/TRAUMA/SEDATION MEDICATION
Status: COMPLETED | OUTPATIENT
Start: 2021-06-23 | End: 2021-06-23

## 2021-06-23 RX ADMIN — Medication 10 ML: at 09:25

## 2021-06-23 NOTE — INTERVAL H&P NOTE
Update: (This section must be completed if the H&P was completed greater than 24 hrs to procedure or admission)    H&P reviewed  After examining the patient, I find no changed to the H&P since it had been written  Patient re-evaluated   Accept as history and physical     Parker Mcbride MD/June 23, 2021/9:32 AM

## 2021-06-23 NOTE — BRIEF OP NOTE (RAD/CATH)
INTERVENTIONAL RADIOLOGY PROCEDURE NOTE    Date: 6/23/2021    Procedure: IR drainage    Preoperative diagnosis:   1  Fried [place] lesion         Postoperative diagnosis: Same  Surgeon: Kelli Ellis MD     Assistant: None  No qualified resident was available  Blood loss: None    Specimens: left thigh fluid     Findings: Large left thigh fluid collection, 281 cc of fluid removed  Complications: None immediate      Anesthesia: local

## 2021-06-23 NOTE — SEDATION DOCUMENTATION
280ml of serous fluid aspirated from left thigh by Dr Bijal Zarate without complications  Tolerated well by patient  Culture sent to lab

## 2021-06-26 LAB
BACTERIA SPEC BFLD CULT: NO GROWTH
GRAM STN SPEC: NORMAL

## 2021-06-28 ENCOUNTER — TELEPHONE (OUTPATIENT)
Dept: OBGYN CLINIC | Facility: HOSPITAL | Age: 26
End: 2021-06-28

## 2021-06-28 ENCOUNTER — PREP FOR PROCEDURE (OUTPATIENT)
Dept: INTERVENTIONAL RADIOLOGY/VASCULAR | Facility: CLINIC | Age: 26
End: 2021-06-28

## 2021-06-28 DIAGNOSIS — T14.8XXA MOREL LAVALLEE LESION: Primary | ICD-10-CM

## 2021-06-28 NOTE — TELEPHONE ENCOUNTER
Jayy Kwok is calling because the lump on her left leg is coming back  Jayy Kwok had the lump aspirated with interventional radiology on Wednesday 06- and the lump started to come back the next day  Please advise      Jayy Kwok [phone]

## 2021-06-28 NOTE — TELEPHONE ENCOUNTER
Given this input, from you, and the patient,    I have placed a new order    They can aspirate this in interventional radiology,  and leave a close suction drain,  such as a Alin-Perry if need be    You may inform the patient that this order has been generated    DIMPLE

## 2021-06-28 NOTE — TELEPHONE ENCOUNTER
Given her diagnosis,   This is not unexpected  They can re-accumulate  She would benefit by giving interventional Radiology this follow-up  These injuries take time to heal and reaccumulation sometimes requires further aspiration or not      You may review this with the patient

## 2021-06-28 NOTE — TELEPHONE ENCOUNTER
I spoke with patient and information above provided  She states that she did make a call first to IR and they did tell her to follow up with Dr Delmy Bonilla with what he would like to do  She states the area is larger than it was before and she is unable to wear pants due to the lump  Please advise

## 2021-07-07 ENCOUNTER — HOSPITAL ENCOUNTER (OUTPATIENT)
Dept: RADIOLOGY | Facility: HOSPITAL | Age: 26
Discharge: HOME | End: 2021-07-07
Attending: STUDENT IN AN ORGANIZED HEALTH CARE EDUCATION/TRAINING PROGRAM | Admitting: RADIOLOGY
Payer: COMMERCIAL

## 2021-07-07 DIAGNOSIS — T14.8XXA MOREL LAVALLEE LESION: ICD-10-CM

## 2021-07-07 PROCEDURE — 76937 US GUIDE VASCULAR ACCESS: CPT

## 2021-07-07 PROCEDURE — C1769 GUIDE WIRE: HCPCS

## 2021-07-07 PROCEDURE — 10030 IMG GID FLU COLL DRG SFT TIS: CPT

## 2021-07-07 PROCEDURE — 10030 IMG GID FLU COLL DRG SFT TIS: CPT | Performed by: RADIOLOGY

## 2021-07-07 PROCEDURE — C1729 CATH, DRAINAGE: HCPCS

## 2021-07-07 NOTE — H&P
Interventional Radiology  History and Physical 7/7/2021     Lars Manning   1995   [id number]    Assessment/Plan:  24-year-old female with prior Lelan Manner lesion of left thigh returns due to recurrent fluid collection  Problem List Items Addressed This Visit        Musculoskeletal and Integument    Lelan Manner lesion    Relevant Orders    IR drainage tube placement with sclerosis             Subjective:     Patient ID: Lars Manning is a 23 y o  female  History of Present Illness  Patient with prior Lelan Manner lesion of left thigh returns due to recurrent fluid collection  Review of Systems   Constitutional: Negative for fever  Respiratory: Negative for shortness of breath  No past medical history on file  Past Surgical History:   Procedure Laterality Date    IR DRAINAGE TUBE PLACEMENT  6/23/2021    MOUTH SURGERY Bilateral     [place] teeth at age 5        Social History     Tobacco Use   Smoking Status Never Smoker   Smokeless Tobacco Never Used        Social History     Substance and Sexual Activity   Alcohol Use Yes    Comment: socially         Social History     Substance and Sexual Activity   Drug Use No        Allergies   Allergen Reactions    Penicillins Rash       Current Outpatient Medications   Medication Sig Dispense Refill    naproxen sodium (ANAPROX) 550 mg tablet       norethindrone-ethinyl estradiol-ferrous fumarate (LOESTIN 24 FE) 1-20 MG-MCG(24) per tablet Take 1 tablet by mouth daily 84 tablet 3     No current facility-administered medications for this encounter  Objective: There were no vitals filed for this visit  Physical Exam  Constitutional:       Appearance: Normal appearance  Pulmonary:      Effort: Pulmonary effort is normal    Musculoskeletal:      Comments: Mild swelling in the left upper medial thigh   Skin:     General: Skin is dry             No results found for: BNP   Lab Results   Component Value Date    WBC 12 54 (H) 12/29/2019    HGB 14 4 12/29/2019    HCT 43 5 12/29/2019    MCV 92 12/29/2019     (H) 12/29/2019     No results found for: INR, PROTIME  No results found for: PTT      I have personally reviewed pertinent imaging and laboratory results  Code Status: No Order  Advance Directive and Living Will:      Power of :    POLST:      This text is generated with voice recognition software  There may be translation, syntax,  or grammatical errors  If you have any questions, please contact the dictating provider

## 2021-07-07 NOTE — DISCHARGE INSTRUCTIONS
TUBE CARE INSTRUCTIONS    Care after your procedure:    Resume your normal diet  Small sips of flat soda will help with nausea  1  The properly functioning catheter should be forward flushed once (1x) daily with 10ml of normal saline using clean technique  You will be given a prescription for flushes  To flush the tube, clean both connections with alcohol swab  Twist off the drainage bag/ bulb  tubing and twist the saline syringe into the drainage tube and flush  Remove the syringe and twist the drainage bag / bulb tubing tubing back on     2  The drainage bag/bulb may be emptied as necessary  Keep a record of the amount of fluid you drain from your tube  This should be done with clean technique as well  3  A fresh dressing should be applied daily over the tube insertion site  4  As the tube is secured to the skin with only a suture,try not to pull on your tube  Tub baths are not permitted  Showers are permitted if the patient's skin entry site is prevented from getting wet  Similarly, washcloth "baths" are acceptable  Contact Interventional Radiology at [phone] [place] PATIENTS: Contact Interventional Radiology at [phone]) Jamar Wood PATIENTS: Contact Interventional Radiology at [phone]) if:    1  Leakage or large amounts of liquid around the catheter  2  Fever of 101 degrees lasting several hours without other obvious cause (such as sore throat, flu, etc)  3  Persistent nausea or vomiting  4  Diminished drainage, which may be associated with pressure or pain  Or when the     drainage from your tube is less than 10mls for 48 hours  5  Catheter pulled back or falls out  The following pharmacies carry the flush syringes         [facility]                     [facility]  [address] [facility]                         [address] [facility]  Phone [phone]            Phone [phone]   [place] [address]                                [phone]  [address] [facility] [address]                      [address] [facility]  Phone [phone]            Phone [phone]                      Ginny Colunga                                                                                                          [phone]  [facility]  [facility] [address]    [address]  Phone [phone]

## 2021-07-07 NOTE — SEDATION DOCUMENTATION
Left upper leg drainage tube placed by Dr Erin Barnard without complications  Pt tolerated well  AVS reviewed with pt, all questions answered

## 2021-07-07 NOTE — BRIEF OP NOTE (RAD/CATH)
INTERVENTIONAL RADIOLOGY PROCEDURE NOTE    Date: 7/7/2021    Procedure: IR DRAINAGE TUBE PLACEMENT WITH SCLEROSIS    Preoperative diagnosis:   1  Reg Carbajal lesion         Postoperative diagnosis: Same  Surgeon: Daniel Duron MD     Assistant: None  No qualified resident was available  Blood loss: None    Specimens: None     Findings:   1  Patient preferred to have a drain in place so that she doesn't have to keep coming back for repeat aspiration  2  8 Fr drain placed into the left upper thigh Jimenez Vinod lesion  Complications: None immediate      Anesthesia: local

## 2021-07-19 ENCOUNTER — HOSPITAL ENCOUNTER (OUTPATIENT)
Dept: RADIOLOGY | Facility: HOSPITAL | Age: 26
Discharge: HOME | End: 2021-07-19
Attending: RADIOLOGY | Admitting: RADIOLOGY
Payer: COMMERCIAL

## 2021-07-19 DIAGNOSIS — T14.8XXA MOREL LAVALLEE LESION: ICD-10-CM

## 2021-07-19 PROCEDURE — 49424 ASSESS CYST CONTRAST INJECT: CPT | Performed by: RADIOLOGY

## 2021-07-19 PROCEDURE — 76080 X-RAY EXAM OF FISTULA: CPT | Performed by: RADIOLOGY

## 2021-07-19 PROCEDURE — 76080 X-RAY EXAM OF FISTULA: CPT

## 2021-07-19 PROCEDURE — 49424 ASSESS CYST CONTRAST INJECT: CPT

## 2021-07-19 RX ADMIN — IOHEXOL 1 ML: 350 INJECTION, SOLUTION INTRAVENOUS at 12:09

## 2021-07-19 NOTE — DISCHARGE INSTRUCTIONS
Drainage Tube Removal    Your drainage tube was removed today  What you need know at home:   Keep a clean dry dressing at the tube site until the small opening closes  It will take twenty four to forty eight hours  Keep the site dry until it heals  A small amount of drainage on your dressing is normal  Resume your normal diet  Small sips of flat soda will help with any nausea  Contact Interventional Radiology for any of the following: You have pain, fever greater than 101, shaking chills  If you have increased redness or swelling at the site  I the drainage from your site does not stop  If the site drains pus or has a bad odor       Contact Interventional Radiology at [phone]   Toya PATIENTS: Contact Interventional Radiology at [phone]) Maycol Koroma PATIENTS: Contact Interventional Radiology at [phone]) if:

## 2021-07-19 NOTE — H&P
Interventional Radiology  History and Physical 7/19/2021     Dorcas Mart   1995   [id number]    Assessment/Plan:  62-year-old female with prior [place] lesion of left thigh returns underwent drain placement 2 weeks ago and returns for drain check  Problem List Items Addressed This Visit        Musculoskeletal and Integument    Ketan Ararat lesion    Relevant Orders    IR drainage tube check with sclerosis             Subjective:     Patient ID: Dorcas Mart is a 23 y o  female  History of Present Illness  Patient with prior [place] Mark lesion of left thigh returns underwent drain placement 2 weeks ago and returns for drain check  Review of Systems   Constitutional: Negative for fever  Respiratory: Negative for shortness of breath  No past medical history on file  Past Surgical History:   Procedure Laterality Date    IR DRAINAGE TUBE PLACEMENT  6/23/2021    IR DRAINAGE TUBE PLACEMENT WITH SCLEROSIS  7/7/2021    MOUTH SURGERY Bilateral     [place] teeth at age 5        Social History     Tobacco Use   Smoking Status Never Smoker   Smokeless Tobacco Never Used        Social History     Substance and Sexual Activity   Alcohol Use Yes    Comment: socially         Social History     Substance and Sexual Activity   Drug Use No        Allergies   Allergen Reactions    Penicillins Rash       Current Outpatient Medications   Medication Sig Dispense Refill    naproxen sodium (ANAPROX) 550 mg tablet       norethindrone-ethinyl estradiol-ferrous fumarate (LOESTIN 24 FE) 1-20 MG-MCG(24) per tablet Take 1 tablet by mouth daily 84 tablet 3     No current facility-administered medications for this encounter  Objective: There were no vitals filed for this visit  Physical Exam  Constitutional:       Appearance: Normal appearance     Pulmonary:      Effort: Pulmonary effort is normal    Musculoskeletal:      Comments: Left thigh drain present   Skin:     General: Skin is dry  No results found for: BNP   Lab Results   Component Value Date    WBC 12 54 (H) 12/29/2019    HGB 14 4 12/29/2019    HCT 43 5 12/29/2019    MCV 92 12/29/2019     (H) 12/29/2019     No results found for: INR, PROTIME  No results found for: PTT      I have personally reviewed pertinent imaging and laboratory results  Code Status: No Order  Advance Directive and Living Will:      Power of :    POLST:      This text is generated with voice recognition software  There may be translation, syntax,  or grammatical errors  If you have any questions, please contact the dictating provider

## 2021-12-14 ENCOUNTER — HOSPITAL ENCOUNTER (OUTPATIENT)
Dept: RADIOLOGY | Facility: HOSPITAL | Age: 26
Discharge: HOME | End: 2021-12-14
Payer: COMMERCIAL

## 2021-12-14 DIAGNOSIS — R06.00 DYSPNEA, UNSPECIFIED TYPE: ICD-10-CM

## 2021-12-14 PROCEDURE — 71046 X-RAY EXAM CHEST 2 VIEWS: CPT

## 2023-04-24 ENCOUNTER — HOSPITAL ENCOUNTER (OUTPATIENT)
Dept: RADIOLOGY | Facility: HOSPITAL | Age: 28
Discharge: HOME | End: 2023-04-24

## 2023-04-24 ENCOUNTER — OFFICE VISIT (OUTPATIENT)
Dept: OBGYN CLINIC | Facility: CLINIC | Age: 28
End: 2023-04-24

## 2023-04-24 VITALS
BODY MASS INDEX: 33.95 KG/M2 | SYSTOLIC BLOOD PRESSURE: 122 MMHG | HEART RATE: 101 BPM | DIASTOLIC BLOOD PRESSURE: 84 MMHG | OXYGEN SATURATION: 99 % | HEIGHT: 65 IN

## 2023-04-24 DIAGNOSIS — S93.602A SPRAIN OF LEFT FOOT, INITIAL ENCOUNTER: ICD-10-CM

## 2023-04-24 DIAGNOSIS — M79.672 PAIN IN LEFT FOOT: Primary | ICD-10-CM

## 2023-04-24 DIAGNOSIS — M79.672 PAIN IN LEFT FOOT: ICD-10-CM

## 2023-04-24 RX ORDER — DROSPIRENONE AND ETHINYL ESTRADIOL 0.02-3(28)
KIT ORAL
COMMUNITY
Start: 2023-04-10

## 2023-04-24 RX ORDER — DICLOFENAC SODIUM 75 MG/1
75 TABLET, DELAYED RELEASE ORAL 2 TIMES DAILY
Qty: 28 TABLET | Refills: 0 | Status: SHIPPED | OUTPATIENT
Start: 2023-04-24
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
## 2023-05-07 ENCOUNTER — HOSPITAL ENCOUNTER (OUTPATIENT)
Dept: MRI IMAGING | Facility: HOSPITAL | Age: 28
Discharge: HOME | End: 2023-05-07

## 2023-05-07 DIAGNOSIS — M79.672 PAIN IN LEFT FOOT: ICD-10-CM

## 2023-05-07 DIAGNOSIS — S93.602A SPRAIN OF LEFT FOOT, INITIAL ENCOUNTER: ICD-10-CM

## 2023-05-09 ENCOUNTER — TELEPHONE (OUTPATIENT)
Dept: OBGYN CLINIC | Facility: HOSPITAL | Age: 28
End: 2023-05-09

## 2023-05-09 NOTE — TELEPHONE ENCOUNTER
Caller: Cornelio Montaño AN radiology    Doctor/Office : [facility]    Radiology CB# : [phone]    [address] [place] [address] Radiology called to report significant on MRI left foot on 5/7/23

## 2023-05-22 ENCOUNTER — APPOINTMENT (OUTPATIENT)
Dept: RADIOLOGY | Facility: MEDICAL CENTER | Age: 28
End: 2023-05-22

## 2023-05-22 DIAGNOSIS — M79.672 LEFT FOOT PAIN: ICD-10-CM

## 2023-05-22 DIAGNOSIS — S92.314G CLOSED NONDISPLACED FRACTURE OF FIRST METATARSAL BONE OF RIGHT FOOT WITH DELAYED HEALING, SUBSEQUENT ENCOUNTER: ICD-10-CM

## 2023-06-05 ENCOUNTER — APPOINTMENT (OUTPATIENT)
Dept: RADIOLOGY | Facility: MEDICAL CENTER | Age: 28
End: 2023-06-05
Payer: COMMERCIAL

## 2023-06-05 DIAGNOSIS — S92.315D CLOSED NONDISPLACED FRACTURE OF FIRST METATARSAL BONE OF LEFT FOOT WITH ROUTINE HEALING, SUBSEQUENT ENCOUNTER: ICD-10-CM

## 2023-06-05 PROCEDURE — 73630 X-RAY EXAM OF FOOT: CPT

## 2023-06-08 ENCOUNTER — OFFICE VISIT (OUTPATIENT)
Dept: URGENT CARE | Facility: MEDICAL CENTER | Age: 28
End: 2023-06-08
Payer: COMMERCIAL

## 2023-06-08 VITALS
HEIGHT: 65 IN | WEIGHT: 211 LBS | BODY MASS INDEX: 35.16 KG/M2 | RESPIRATION RATE: 22 BRPM | SYSTOLIC BLOOD PRESSURE: 138 MMHG | OXYGEN SATURATION: 99 % | DIASTOLIC BLOOD PRESSURE: 78 MMHG | TEMPERATURE: 98.4 F

## 2023-06-08 DIAGNOSIS — H92.02 OTALGIA OF LEFT EAR: Primary | ICD-10-CM

## 2023-06-08 PROCEDURE — 99213 OFFICE O/P EST LOW 20 MIN: CPT | Performed by: PHYSICIAN ASSISTANT

## 2023-06-08 RX ORDER — DOXYCYCLINE HYCLATE 100 MG/1
100 CAPSULE ORAL EVERY 12 HOURS SCHEDULED
Qty: 20 CAPSULE | Refills: 0 | Status: SHIPPED | OUTPATIENT
Start: 2023-06-08 | End: 2023-06-18

## 2023-06-08 NOTE — PROGRESS NOTES
[address]        NAME: Teena Li is a 24 y o  female  : 1995    MRN: [id number]  DATE: 2023  TIME: 2:20 PM    Assessment and Plan   Otalgia of left ear [H92 02]  1  Otalgia of left ear  doxycycline hyclate (VIBRAMYCIN) 100 mg capsule            Patient Instructions     1  Apply warm compresses to the left ear 3-4 times daily for 20 to 30 minutes until symptoms improve  2   If the symptoms worsen or if you do develop redness, swelling of the ear canal, fill the doxycycline prescription and take it as directed  3   Go to the ER immediately for any extreme symptoms  #4 follow-up here with primary care in 7 to 10 days for any persistent symptoms  Chief Complaint     Chief Complaint   Patient presents with   • Earache     Pt reports has ear pain in her left ear x2days          History of Present Illness       61-year-old female patient with a 2-day history of persistent external left ear canal tenderness and mild pain  Patient denies any trauma  No recent illness symptoms  Patient states when she palpates the area feels slightly swollen and tender to touch  She denies any discharge or drainage  No fever or chills  No other complaints or illnesses  Patient is concerned because she is going to be leaving for vacation  Review of Systems   Review of Systems   Constitutional: Negative for chills and fever  HENT: Positive for ear pain  Negative for sore throat  Eyes: Negative for pain and visual disturbance  Respiratory: Negative for cough and shortness of breath  Cardiovascular: Negative for chest pain and palpitations  Gastrointestinal: Negative for abdominal pain and vomiting  Genitourinary: Negative for dysuria and hematuria  Musculoskeletal: Negative for arthralgias and back pain  Skin: Negative for color change and rash  Neurological: Negative for seizures and syncope  All other systems reviewed and are negative          Current Medications "      Current Outpatient Medications:   •  doxycycline hyclate (VIBRAMYCIN) 100 mg capsule, Take 1 capsule (100 mg total) by mouth every 12 (twelve) hours for 10 days, Disp: 20 capsule, Rfl: 0  •  drospirenone-ethinyl estradiol (SHAMA) 3-0 02 MG per tablet, , Disp: , Rfl:   •  diclofenac (VOLTAREN) 75 mg EC tablet, Take 1 tablet (75 mg total) by mouth 2 (two) times a day (Patient not taking: Reported on 6/8/2023), Disp: 28 tablet, Rfl: 0    Current Allergies     Allergies as of 06/08/2023 - Reviewed 06/08/2023   Allergen Reaction Noted   • Penicillins Rash 06/08/2017            The following portions of the patient's history were reviewed and updated as appropriate: allergies, current medications, past family history, past medical history, past social history, past surgical history and problem list      No past medical history on file  Past Surgical History:   Procedure Laterality Date   • IR DRAINAGE TUBE CHECK WITH SCLEROSIS  7/19/2021   • IR DRAINAGE TUBE PLACEMENT  6/23/2021   • IR DRAINAGE TUBE PLACEMENT WITH SCLEROSIS  7/7/2021   • MOUTH SURGERY Bilateral     [place] teeth at age 5       Family History   Problem Relation Age of Onset   • Breast cancer Mother    • Anemia Family    • Other Family         Blood Dyscrasia   • Heart disease Family         Cardiac Disorder         Medications have been verified  Objective   /78   Temp 98 4 °F (36 9 °C)   Resp 22   Ht 5' 5\" (1 651 m)   Wt 95 7 kg (211 lb)   SpO2 99%   BMI 35 11 kg/m²        Physical Exam     Physical Exam  Constitutional:       Appearance: Normal appearance  HENT:      Head: Normocephalic  Ears:      Comments: Tenderness noted to the superior aspect of the left distal ear canal where it meets the auricle  No redness, swelling, fluctuance seen or palpable  Nose: Nose normal    Eyes:      Conjunctiva/sclera: Conjunctivae normal       Pupils: Pupils are equal, round, and reactive to light     Cardiovascular:      Rate and " Rhythm: Normal rate  Pulmonary:      Effort: Pulmonary effort is normal    Abdominal:      Tenderness: There is no abdominal tenderness  Musculoskeletal:         General: Normal range of motion  Cervical back: Normal range of motion  Skin:     Capillary Refill: Capillary refill takes less than 2 seconds  Neurological:      General: No focal deficit present  Mental Status: She is alert and oriented to person, place, and time  Psychiatric:         Behavior: Behavior normal              Medical decision making note:  Due to the patient's circumstances with vacation, [place] method prescription for doxycycline given in the event that your canal cellulitis or abscess becomes more apparent while she is away  In the meantime, will use warm compresses for discomfort and will observe

## 2023-06-08 NOTE — PATIENT INSTRUCTIONS
1   Apply warm compresses to the left ear 3-4 times daily for 20 to 30 minutes until symptoms improve  2   If the symptoms worsen or if you do develop redness, swelling of the ear canal, fill the doxycycline prescription and take it as directed  3   Go to the ER immediately for any extreme symptoms  #4 follow-up here with primary care in 7 to 10 days for any persistent symptoms

## 2023-06-14 DIAGNOSIS — H92.02 OTALGIA OF LEFT EAR: ICD-10-CM

## 2023-06-20 RX ORDER — DOXYCYCLINE HYCLATE 100 MG/1
CAPSULE ORAL
Qty: 20 CAPSULE | Refills: 0 | Status: SHIPPED | OUTPATIENT
Start: 2023-06-20 | End: 2023-06-28

## 2023-06-26 DIAGNOSIS — H92.02 OTALGIA OF LEFT EAR: ICD-10-CM

## 2023-06-28 RX ORDER — DOXYCYCLINE HYCLATE 100 MG/1
CAPSULE ORAL
Qty: 20 CAPSULE | Refills: 0 | Status: SHIPPED | OUTPATIENT
Start: 2023-06-28 | End: 2023-07-05

## 2023-07-03 DIAGNOSIS — H92.02 OTALGIA OF LEFT EAR: ICD-10-CM

## 2023-07-05 RX ORDER — DOXYCYCLINE HYCLATE 100 MG/1
CAPSULE ORAL
Qty: 20 CAPSULE | Refills: 0 | Status: SHIPPED | OUTPATIENT
Start: 2023-07-05 | End: 2023-07-15

## 2023-07-10 DIAGNOSIS — H92.02 OTALGIA OF LEFT EAR: ICD-10-CM

## 2023-07-11 RX ORDER — DOXYCYCLINE HYCLATE 100 MG/1
CAPSULE ORAL
Qty: 20 CAPSULE | Refills: 0 | OUTPATIENT
Start: 2023-07-11 | End: 2023-07-21

## 2023-07-11 NOTE — TELEPHONE ENCOUNTER
Patient was seen as an urgent care patient, therefore, reordering the prescription without reassessing the patient would be inappropriate. Patient should either follow-up with her primary care provider or return here for reevaluation.

## 2023-07-12 DIAGNOSIS — S93.602A SPRAIN OF LEFT FOOT, INITIAL ENCOUNTER: ICD-10-CM

## 2023-07-12 DIAGNOSIS — M79.672 PAIN IN LEFT FOOT: ICD-10-CM

## 2023-07-12 RX ORDER — DICLOFENAC SODIUM 75 MG/1
TABLET, DELAYED RELEASE ORAL
Qty: 28 TABLET | Refills: 0 | OUTPATIENT
Start: 2023-07-12

## 2023-11-01 ENCOUNTER — APPOINTMENT (OUTPATIENT)
Dept: URGENT CARE | Facility: MEDICAL CENTER | Age: 28
End: 2023-11-01

## 2023-11-01 DIAGNOSIS — Z02.1 PHYSICAL EXAM, PRE-EMPLOYMENT: ICD-10-CM

## 2023-11-01 PROCEDURE — 86480 TB TEST CELL IMMUN MEASURE: CPT | Performed by: STUDENT IN AN ORGANIZED HEALTH CARE EDUCATION/TRAINING PROGRAM

## 2023-11-02 LAB
GAMMA INTERFERON BACKGROUND BLD IA-ACNC: 0.02 IU/ML
M TB IFN-G BLD-IMP: NEGATIVE
M TB IFN-G CD4+ BCKGRND COR BLD-ACNC: 0.01 IU/ML
M TB IFN-G CD4+ BCKGRND COR BLD-ACNC: 0.01 IU/ML
MITOGEN IGNF BCKGRD COR BLD-ACNC: 9.98 IU/ML

## 2023-11-08 ENCOUNTER — APPOINTMENT (OUTPATIENT)
Dept: LAB | Facility: MEDICAL CENTER | Age: 28
End: 2023-11-08
Payer: COMMERCIAL

## 2023-11-08 DIAGNOSIS — Z23 NEED FOR PROPHYLACTIC VACCINATION AND INOCULATION AGAINST VARICELLA: ICD-10-CM

## 2023-11-08 LAB — VZV IGG SER QL IA: NORMAL

## 2023-11-08 PROCEDURE — 36415 COLL VENOUS BLD VENIPUNCTURE: CPT

## 2023-11-08 PROCEDURE — 86787 VARICELLA-ZOSTER ANTIBODY: CPT

## 2024-02-06 ENCOUNTER — TELEPHONE (OUTPATIENT)
Age: 29
End: 2024-02-06

## 2024-02-06 ENCOUNTER — OFFICE VISIT (OUTPATIENT)
Dept: BARIATRICS | Facility: CLINIC | Age: 29
End: 2024-02-06
Payer: COMMERCIAL

## 2024-02-06 VITALS
SYSTOLIC BLOOD PRESSURE: 106 MMHG | HEART RATE: 87 BPM | BODY MASS INDEX: 36.06 KG/M2 | RESPIRATION RATE: 18 BRPM | HEIGHT: 65 IN | DIASTOLIC BLOOD PRESSURE: 80 MMHG | OXYGEN SATURATION: 98 % | WEIGHT: 216.4 LBS

## 2024-02-06 DIAGNOSIS — N91.2 AMENORRHEA: ICD-10-CM

## 2024-02-06 DIAGNOSIS — N94.6 DYSMENORRHEA: ICD-10-CM

## 2024-02-06 DIAGNOSIS — R53.83 FATIGUE: ICD-10-CM

## 2024-02-06 DIAGNOSIS — E66.09 CLASS 2 OBESITY DUE TO EXCESS CALORIES IN ADULT: Primary | ICD-10-CM

## 2024-02-06 DIAGNOSIS — Z83.438 FAMILY HISTORY OF ELEVATED BLOOD LIPIDS: ICD-10-CM

## 2024-02-06 PROCEDURE — 99204 OFFICE O/P NEW MOD 45 MIN: CPT | Performed by: FAMILY MEDICINE

## 2024-02-06 NOTE — TELEPHONE ENCOUNTER
PA for Wegovy    Submitted via  []CMM-KEY    [x][facility]-Case ID # [id number]   []Faxed to plan   []Other website    []Phone call Case ID #      Office notes sent, clinical questions answered. Awaiting determination

## 2024-02-06 NOTE — PROGRESS NOTES
Assessment/Plan:  Queenie was seen today for consult.    Diagnoses and all orders for this visit:    Class 2 obesity due to excess calories in adult  -     Insulin, fasting; Future  -     LIPID PANEL + LDL/HDL RATIO; Future  -     Semaglutide-Weight Management (WEGOVY) 0.25 MG/0.5ML; Inject 0.5 mL (0.25 mg total) under the skin once a week  - Patient denies personal history of pancreatitis. Patient also denies personal and family history of medullary thyroid cancer and multiple endocrine neoplasia type 2 (MEN 2 tumor). Patient understands these major side effects and agrees to start the medication.  Contraception methods needed and encouraged, risk for  fetal harm discussed.   Medical agreement signed  Dysmenorrhea  -     Insulin, fasting; Future    Fatigue  -     Insulin, fasting; Future    Family history of elevated blood lipids  -     LIPID PANEL + LDL/HDL RATIO; Future    Amenorrhea  -     FSH and LH; Future  -     Prolactin; Future  -     Testosterone, free, total; Future  -     Sex Hormone Binding Globulin; Future    She has associated headache and amenorrhea      1/29/24  HbA1C 4.9  TSH 2.01  NORMAL RENAL AND LIIVER FC   Obesity:   Weight not at goal and patient tried more than 6 months to lose weight and was not able to achieve a meaningful weight loss above 5%  - Discussed options of HealthyCORE-Intensive Lifestyle Intervention Program and Conservative Program and the role of weight loss medications.  - Patient is interested in pursuing Healthy Core program  - Initial weight loss goal of 5-10% weight loss for improved health  - Weight loss can improve patient's co-morbid conditions and/or prevent weight-related complications.  Motivational interview performed and patient noted changes to work on until next visit  Handouts provided:  Calorie goal handouts provided  1200kcal   Hydration: 64oz fluid, no sugary drinks  Goal 3 meals per day  Food log encouraged , phone wei or paper journal  Increase physical  activity by 10 minutes daily.   Return in 3 mo    Subjective:   Chief Complaint   Patient presents with    Consult     Initial Consultation. SB =  .  Waist: 40.5 inches       Patient ID: Queenie Mendez  is a 28 y.o. female with excess weight/obesity here to pursue weight management.  Previous notes and records have been reviewed.        HPI  Wt Readings from Last 20 Encounters:   24 98.2 kg (216 lb 6.4 oz)   23 95.7 kg (211 lb)   21 92.5 kg (204 lb)   21 91.4 kg (201 lb 6.4 oz)   10/27/20 90.1 kg (198 lb 9.6 oz)   01/10/20 94.6 kg (208 lb 9.6 oz)   19 93 kg (205 lb)   19 96.2 kg (212 lb 1.3 oz)   10/25/19 92.2 kg (203 lb 3.2 oz)   19 86.2 kg (190 lb)   10/19/18 92.3 kg (203 lb 6.4 oz)   10/27/17 90.8 kg (200 lb 3.2 oz)   17 90.3 kg (199 lb)   17 83.9 kg (185 lb) (95%, Z= 1.69)*     * Growth percentiles are based on CDC (Girls, 2-20 Years) data.     Obesity/Excess Weight:Body mass index is 36.01 kg/m².    Severity: severe  Onset: 24 yo when started BCP increased 50 lbs in 1 year  Modifiers: diets  Contributing factors:  irregular menses  Associated symptoms: fatigue and decreased self esteem    Has 3 meals per day cut out starches  Hydration:64 oz water 1 cup coffee  Alcohol: cut out   Smoking:no  Exercise:45 min cardio daily beach body   Occupation:works for pharmaceutical and studies in nursing program  Sleep:ok  STOP ban/8    History reviewed. No pertinent past medical history.  Past Surgical History:   Procedure Laterality Date    IR DRAINAGE TUBE CHECK WITH SCLEROSIS  2021    IR DRAINAGE TUBE PLACEMENT  2021    IR DRAINAGE TUBE PLACEMENT WITH SCLEROSIS  2021    MOUTH SURGERY Bilateral     [place] teeth at age 6     The following portions of the patient's history were reviewed and updated as appropriate: allergies, current medications, past family history, past medical history, past social history, past surgical history, and problem  "list.    ROS:  Review of Systems   Constitutional: Negative for activity change. Fatigue  HENT: Negative for trouble swallowing.    Respiratory: Negative for shortness of breath.    Cardiovascular: Negative for chest pain, edema  Gastrointestinal: Negative for abdominal pain, nausea and vomiting, acid reflux, constipation/diarrhea  Endocrine: amenorrhea, headaches  Psychiatric/Behavioral: Negative for behavioral problems including anxiety /depression  Objective:  /80 (BP Location: Left arm, Patient Position: Sitting, Cuff Size: Adult)   Pulse 87   Resp 18   Ht 5' 5\" (1.651 m)   Wt 98.2 kg (216 lb 6.4 oz)   SpO2 98%   BMI 36.01 kg/m²   Constitutional: Well-developed, well-nourished and Obese Body mass index is 36.01 kg/m².. Alert, cooperative.  HEENT: No conjunctival pallor or jaundice  Pulmonary: No increased work of breathing or signs of respiratory distress.  CV: Well-perfused, Normal rate    Vascular: no peripheral edema  GI: Abdomen obese, Non-distended  MSK: no sarcopenia noted   Neuro: Oriented to person, place and time. Normal Speech  Psych: Normal affect and mood. Normal thought process, no delusions      Labs and Imaging  Recent labs and imaging have been personally reviewed.  Lab Results   Component Value Date    WBC 12.54 (H) 12/29/2019    HGB 14.4 12/29/2019    HCT 43.5 12/29/2019    MCV 92 12/29/2019     (H) 12/29/2019     Lab Results   Component Value Date     03/16/2014    SODIUM 143 12/29/2019    K 4.5 12/29/2019     12/29/2019    CO2 25 12/29/2019    ANIONGAP 5 03/16/2014    AGAP 12 12/29/2019    BUN 13 12/29/2019    CREATININE 0.76 12/29/2019    GLUC 72 12/29/2019    GLUF 83 04/02/2019    CALCIUM 9.5 12/29/2019    AST 22 12/29/2019    ALT 25 12/29/2019    ALKPHOS 58 12/29/2019    TP 8.4 (H) 12/29/2019    TBILI 0.20 12/29/2019    EGFR 112 12/29/2019     Lab Results   Component Value Date    HGBA1C 5.3 04/02/2019     Lab Results   Component Value Date    [id number] " 2.220 04/02/2019     Lab Results   Component Value Date    CHOLESTEROL 144 04/02/2019     Lab Results   Component Value Date    HDL 47 04/02/2019     Lab Results   Component Value Date    TRIG 65 04/02/2019     Lab Results   Component Value Date    LDLCALC 84 04/02/2019

## 2024-02-07 NOTE — TELEPHONE ENCOUNTER
PA for wegovy Approved   Date(s) approved January 7, 2024 to September 3, 2024   Case #     Patient advised by [x] [place] Message                      [] Phone call   =  Approval letter scanned into Media Yes

## 2024-02-15 ENCOUNTER — CLINICAL SUPPORT (OUTPATIENT)
Dept: BARIATRICS | Facility: CLINIC | Age: 29
End: 2024-02-15

## 2024-02-15 VITALS — BODY MASS INDEX: 35.99 KG/M2 | HEIGHT: 65 IN | WEIGHT: 216 LBS

## 2024-02-15 DIAGNOSIS — R63.5 ABNORMAL WEIGHT GAIN: Primary | ICD-10-CM

## 2024-02-15 PROCEDURE — RECHECK

## 2024-02-15 PROCEDURE — WMPRO12

## 2024-02-15 NOTE — PROGRESS NOTES
Weight Management Medical Nutrition Assessment  Queenie was here today as a new start in the Healthy [facility] program.  Today she weighs 216 lbs and has a goal weight of 160 - 165 lbs.  She has been following a paleo diet but has not lost much weight.  She is exercising regularly and does weigh her food, but does not currently log anything.  Reviewed her calorie carb and protein needs.  Recommend that she start food logging to be accountable and more accurate.   Goals:  Log all meals/snacks.  Continue exercising 3 - 4 times a week.    Patient seen by Medical Provider in past 6 months:  yes  Requested to schedule appointment with Medical Provider: No      Anthropometric Measurements  Start Weight (#): 216  Current Weight (#): 216  TBW % Change from start weight:0%  Ideal Body Weight (#):125  Goal Weight (#):160 - 165  Highest:current  Lowest:160    Weight Loss History  Previous weight loss attempts: Counseling with  MD  Exercise  Self Created Diets (Portion Control, Healthy Food Choices, etc.)    Food and Nutrition Related History  Wake up: 7 am   Bed Time:8 - 9 pm    Food Recall  Breakfast:oatmeal or eggs or yogurt and granola    Snack:fruit  Lunch:leftovers (chicken or burger with no bun)  Snack:cashews or almonds  Dinner:protein veg and potatoes  Snack:none      Beverages: water, coffee  Volume of beverage intake: 96 oz    Weekends: Same  Cravings: sweets  Trouble area of day:midday    Frequency of Eating out: irregularly  Food restrictions:gluten  Cooking: self   Food Shopping: self    Physical Activity Intake  Activity:carido and MILADY  Frequency: 4 times  a week  Physical limitations/barriers to exercise: none    Estimated Needs  Energy    [address] Energy Needs: BMR : 1721    1-2# loss weekly sedentary:  1065 - 1565             1-2# loss weekly lightly active:1366 - 1866  Maintenance calories for sedentary activity level: 2065  Protein:68 - 85      (1.2-1.5g/kg IBW)  Fluid: 66     (35mL/kg IBW)    Nutrition  Diagnosis  Yes;    Overweight/obesity  related to Excess energy intake as evidenced by  BMI more than normative standard for age and sex (obesity-grade II 35-39.9)       Nutrition Intervention    Nutrition Prescription  Calories:1200 - 1500  Protein:68 - 85  Fluid:66    Meal Plan (Daniel/Pro/Carb)  Breakfast: 300/20/30  Snack:  Lunch:400/28/15  Snack:100/10/15  Dinner:400 - 500/28/30  Snack:    Nutrition Education:    Healthy Core Manual  Calorie controlled menu  Lean protein food choices  Healthy snack options  Food journaling tips      Nutrition Counseling:  Strategies: meal planning, portion sizes, healthy snack choices, hydration, fiber intake, protein intake, exercise, food journal      Monitoring and Evaluation:  Evaluation criteria:  Energy Intake  Meet protein needs  Maintain adequate hydration  Monitor weekly weight  Meal planning/preparation  Food journal   Decreased portions at mealtimes and snacks  Physical activity     Barriers to learning:none  Readiness to change: Action:  (Changing behavior)  Comprehension: good  Expected Compliance: good

## 2024-02-21 ENCOUNTER — CLINICAL SUPPORT (OUTPATIENT)
Dept: BARIATRICS | Facility: CLINIC | Age: 29
End: 2024-02-21

## 2024-02-21 VITALS — HEIGHT: 65 IN | WEIGHT: 214.6 LBS | BODY MASS INDEX: 35.75 KG/M2

## 2024-02-21 DIAGNOSIS — R63.5 ABNORMAL WEIGHT GAIN: Primary | ICD-10-CM

## 2024-02-21 PROBLEM — Z11.3 SCREENING FOR STD (SEXUALLY TRANSMITTED DISEASE): Status: RESOLVED | Noted: 2018-10-19 | Resolved: 2024-02-21

## 2024-02-21 PROBLEM — Z12.4 CERVICAL CANCER SCREENING: Status: RESOLVED | Noted: 2018-10-19 | Resolved: 2024-02-21

## 2024-02-21 PROBLEM — Z01.419 ENCOUNTER FOR GYNECOLOGICAL EXAMINATION (GENERAL) (ROUTINE) WITHOUT ABNORMAL FINDINGS: Status: RESOLVED | Noted: 2018-10-19 | Resolved: 2024-02-21

## 2024-02-21 PROCEDURE — RECHECK

## 2024-02-28 ENCOUNTER — CLINICAL SUPPORT (OUTPATIENT)
Dept: BARIATRICS | Facility: CLINIC | Age: 29
End: 2024-02-28

## 2024-02-28 VITALS — BODY MASS INDEX: 35.35 KG/M2 | HEIGHT: 65 IN | WEIGHT: 212.2 LBS

## 2024-02-28 DIAGNOSIS — R63.5 ABNORMAL WEIGHT GAIN: Primary | ICD-10-CM

## 2024-02-28 PROCEDURE — RECHECK

## 2024-02-28 NOTE — PROGRESS NOTES
Weight Management Medical Nutrition Assessment  Queenie was here today for her 2 week follow-up and body comp in the Healthy [facility] program.  Today she weighs 212.6 lbs giving her a loss of 3.4 lbs in the last 2 weeks.She has been logging her food and measuring portions.  She continues to work out several times a week, usually 5 - 6 ties.  She is switching between cardio and strength training.     Goals:  Log all meals/snacks.  Continue exercising 3 - 4 times a week.     Patient seen by Medical Provider in past 6 months:  yes  Requested to schedule appointment with Medical Provider: No        Anthropometric Measurements  Start Weight (#): 216  Current Weight (#): 212.4  TBW % Change from start weight:1%  Ideal Body Weight (#):125  Goal Weight (#):160 - 165  Highest:current  Lowest:160     Weight Loss History  Previous weight loss attempts: Counseling with  MD  Exercise  Self Created Diets (Portion Control, Healthy Food Choices, etc.)     Food and Nutrition Related History  Wake up: 7 am              Bed Time:8 - 9 pm     Food Recall  Breakfast:oatmeal or eggs or yogurt and granola                    Snack:fruit  Lunch:leftovers (chicken or burger with no bun)  Snack:cashews or almonds  Dinner:protein veg and potatoes  Snack:none        Beverages: water, coffee  Volume of beverage intake: 96 oz     Weekends: Same  Cravings: sweets  Trouble area of day:midday     Frequency of Eating out: irregularly  Food restrictions:gluten  Cooking: self   Food Shopping: self     Physical Activity Intake  Activity:carido and MILADY  Frequency: 4 times  a week  Physical limitations/barriers to exercise: none     Estimated Needs  Energy     [place] [address] [employer] Energy Needs: BMR : 1721    1-2# loss weekly sedentary:  1065 - 1565             1-2# loss weekly lightly active:1366 - 1866  Maintenance calories for sedentary activity level: 2065  Protein:68 - 85      (1.2-1.5g/kg IBW)  Fluid: 66     (35mL/kg IBW)     Nutrition Diagnosis  Yes;     Overweight/obesity  related to Excess energy intake as evidenced by  BMI more than normative standard for age and sex (obesity-grade II 35-39.9)     Nutrition Intervention     Nutrition Prescription  Calories:1200 - 1500  Protein:68 - 85  Fluid:66     Meal Plan (Daniel/Pro/Carb)  Breakfast: 300/20/30  Snack:  Lunch:400/28/15  Snack:100/10/15  Dinner:400 - 500/28/30  Snack:     Nutrition Education:    Healthy Core Manual  Calorie controlled menu  Lean protein food choices  Healthy snack options  Food journaling tips        Nutrition Counseling:  Strategies: meal planning, portion sizes, healthy snack choices, hydration, fiber intake, protein intake, exercise, food journal        Monitoring and Evaluation:  Evaluation criteria:  Energy Intake  Meet protein needs  Maintain adequate hydration  Monitor weekly weight  Meal planning/preparation  Food journal   Decreased portions at mealtimes and snacks  Physical activity      Barriers to learning:none  Readiness to change: Action:  (Changing behavior)  Comprehension: good  Expected Compliance: good

## 2024-03-06 ENCOUNTER — CLINICAL SUPPORT (OUTPATIENT)
Dept: BARIATRICS | Facility: CLINIC | Age: 29
End: 2024-03-06

## 2024-03-06 VITALS — WEIGHT: 211.2 LBS | HEIGHT: 65 IN | BODY MASS INDEX: 35.19 KG/M2

## 2024-03-06 DIAGNOSIS — R63.5 ABNORMAL WEIGHT GAIN: Primary | ICD-10-CM

## 2024-03-06 PROCEDURE — RECHECK

## 2024-03-11 DIAGNOSIS — E66.09 CLASS 2 OBESITY DUE TO EXCESS CALORIES IN ADULT: ICD-10-CM

## 2024-03-11 RX ORDER — SEMAGLUTIDE 0.25 MG/.5ML
INJECTION, SOLUTION SUBCUTANEOUS
Qty: 2 ML | Refills: 1 | OUTPATIENT
Start: 2024-03-11

## 2024-03-11 NOTE — TELEPHONE ENCOUNTER
Good Day,    Please see request for medication. Patient may be due for increased dose. Please inform staff your thoughts..    Thank you

## 2024-03-20 ENCOUNTER — CLINICAL SUPPORT (OUTPATIENT)
Dept: BARIATRICS | Facility: CLINIC | Age: 29
End: 2024-03-20

## 2024-03-20 VITALS — HEIGHT: 65 IN | BODY MASS INDEX: 34.99 KG/M2 | WEIGHT: 210 LBS

## 2024-03-20 DIAGNOSIS — R63.5 ABNORMAL WEIGHT GAIN: Primary | ICD-10-CM

## 2024-03-20 PROCEDURE — RECHECK

## 2024-03-29 ENCOUNTER — TELEPHONE (OUTPATIENT)
Dept: BARIATRICS | Facility: CLINIC | Age: 29
End: 2024-03-29

## 2024-03-29 NOTE — TELEPHONE ENCOUNTER
Left voicemail message for Queenie to stop the Wegovy. If she has vomiting or diarrhea and can't keep down food or fluids, advised to go to the emergency room.

## 2024-04-01 DIAGNOSIS — E66.09 CLASS 2 OBESITY DUE TO EXCESS CALORIES IN ADULT: Primary | ICD-10-CM

## 2024-04-03 ENCOUNTER — CLINICAL SUPPORT (OUTPATIENT)
Dept: BARIATRICS | Facility: CLINIC | Age: 29
End: 2024-04-03

## 2024-04-03 VITALS — WEIGHT: 207.8 LBS | HEIGHT: 65 IN | BODY MASS INDEX: 34.62 KG/M2

## 2024-04-03 DIAGNOSIS — R63.5 ABNORMAL WEIGHT GAIN: Primary | ICD-10-CM

## 2024-04-03 PROCEDURE — RECHECK

## 2024-04-10 ENCOUNTER — TELEPHONE (OUTPATIENT)
Dept: BARIATRICS | Facility: CLINIC | Age: 29
End: 2024-04-10

## 2024-04-10 ENCOUNTER — CLINICAL SUPPORT (OUTPATIENT)
Dept: BARIATRICS | Facility: CLINIC | Age: 29
End: 2024-04-10

## 2024-04-10 VITALS — WEIGHT: 207 LBS | HEIGHT: 65 IN | BODY MASS INDEX: 34.49 KG/M2

## 2024-04-10 DIAGNOSIS — E66.09 CLASS 2 OBESITY DUE TO EXCESS CALORIES IN ADULT: ICD-10-CM

## 2024-04-10 DIAGNOSIS — R63.5 ABNORMAL WEIGHT GAIN: Primary | ICD-10-CM

## 2024-04-10 PROCEDURE — RECHECK

## 2024-04-10 NOTE — TELEPHONE ENCOUNTER
----- Message from Juan Angulo sent at 4/10/2024  1:16 PM EDT -----  Regarding: Wegovy  Contact: [phone]  Good Day Dr. Hassan,     Please see patient note regarding absence of symptoms. Advise staff your thoughts.     Thank you      ----- Message -----  From: Queenie Mendez  Sent: 4/10/2024   1:10 PM EDT  To: Weight Management [facility]  Subject: Wegovy                                           Hi,     No nausea, all side effects subsided within 24 hours.

## 2024-04-10 NOTE — PROGRESS NOTES
Weight Management Medical Nutrition Assessment  Queenie was here today for her 2 month follow-up  in the Healthy [facility] program. Today she weighs 207 lbs giving her a loss of 5.4 lbs in the last 5 weeks. She admits that she has not been logging her food as consistently as she was in the beginning but  is being mindful of portion and her food choices She continues to work out several times a week, usually 5 - 6 ties.  She is switching between cardio and strength training. She recently started taking Weygovi and reports that it has helped with cravings and mindless eating.      Continued Goals:  Log all meals/snacks.  Continue exercising 3 - 4 times a week.         Patient seen by Medical Provider in past 6 months:  yes  Requested to schedule appointment with Medical Provider: No        Anthropometric Measurements  Start Weight (#): 216  Current Weight (#): 212.4  TBW % Change from start weight:1%  Ideal Body Weight (#):125  Goal Weight (#):160 - 165  Highest:current  Lowest:160     Weight Loss History  Previous weight loss attempts: Counseling with  MD  Exercise  Self Created Diets (Portion Control, Healthy Food Choices, etc.)     Food and Nutrition Related History  Wake up: 7 am              Bed Time:8 - 9 pm     Food Recall  Breakfast:eggs and turkey sausage or will have a protien shakes        Snack:fruit  Lunch:leftovers (chicken or burger with no bun)  Snack:cashews or almonds  Dinner:protein veg and potatoes or hsake depending on shcedule  Snack:none        Beverages: water, coffee  Volume of beverage intake: 96 oz     Weekends: Same  Cravings: sweets  Trouble area of day:midday     Frequency of Eating out: irregularly  Food restrictions:gluten  Cooking: self   Food Shopping: self     Physical Activity Intake  Activity:carido and MILADY  Frequency: 4 times  a week  Physical limitations/barriers to exercise: none     Estimated Needs  Energy     [employer]isa [employer] Energy Needs: BMR : 1680    1-2# loss weekly sedentary:   1016 - 1516            1-2# loss weekly lightly active:81350 - 4368  Maintenance calories for sedentary activity level: 2016  Protein:68 - 85      (1.2-1.5g/kg IBW)  Fluid: 66     (35mL/kg IBW)     Nutrition Diagnosis  Yes;    Overweight/obesity  related to Excess energy intake as evidenced by  BMI more than normative standard for age and sex (obesity-grade II 35-39.9)     Nutrition Intervention     Nutrition Prescription  Calories:1200 - 1500  Protein:68 - 85  Fluid:66     Meal Plan (Daniel/Pro/Carb)  Breakfast: 300/20/30  Snack:  Lunch:400/28/15  Snack:100/10/15  Dinner:400 - 500/28/30  Snack:     Nutrition Education:    Healthy Core Manual  Calorie controlled menu  Lean protein food choices  Healthy snack options  Food journaling tips        Nutrition Counseling:  Strategies: meal planning, portion sizes, healthy snack choices, hydration, fiber intake, protein intake, exercise, food journal        Monitoring and Evaluation:  Evaluation criteria:  Energy Intake  Meet protein needs  Maintain adequate hydration  Monitor weekly weight  Meal planning/preparation  Food journal   Decreased portions at mealtimes and snacks  Physical activity      Barriers to learning:none  Readiness to change: Action:  (Changing behavior)  Comprehension: good  Expected Compliance: good

## 2024-04-17 ENCOUNTER — CLINICAL SUPPORT (OUTPATIENT)
Dept: BARIATRICS | Facility: CLINIC | Age: 29
End: 2024-04-17

## 2024-04-17 VITALS — BODY MASS INDEX: 34.49 KG/M2 | HEIGHT: 65 IN | WEIGHT: 207 LBS

## 2024-04-17 DIAGNOSIS — R63.5 ABNORMAL WEIGHT GAIN: Primary | ICD-10-CM

## 2024-04-17 PROCEDURE — RECHECK

## 2024-04-20 DIAGNOSIS — E66.9 OBESITY, CLASS I, BMI 30-34.9: Primary | ICD-10-CM

## 2024-04-20 DIAGNOSIS — E66.09 CLASS 2 OBESITY DUE TO EXCESS CALORIES IN ADULT: ICD-10-CM

## 2024-04-22 ENCOUNTER — TELEPHONE (OUTPATIENT)
Age: 29
End: 2024-04-22

## 2024-04-22 NOTE — TELEPHONE ENCOUNTER
Pt of Dr. Grimes called in requesting wegovy 0.25mg script to be sent to the [facility].   Called pharmacy and they states they have received the script 4/10 but it is saying unable to fill / may be too early on their end. Pharmacist is looking into this and will call back if needed.   No other questions at this time.

## 2024-04-22 NOTE — TELEPHONE ENCOUNTER
Patient called stating the incorrect dose of Wegovy was sent to the pharmacy, patient states is to be taking Wegovy 0.5mg.  patient needs this corrected and sent to [facility] as soon as possible, they only have 1 box left and patient needs for Tomorrows dose.

## 2024-04-23 ENCOUNTER — TELEPHONE (OUTPATIENT)
Dept: BARIATRICS | Facility: CLINIC | Age: 29
End: 2024-04-23

## 2024-04-23 NOTE — TELEPHONE ENCOUNTER
Note from payer: An active PA is already on file with expiration date of 09/03/2024. Please wait to resubmit request within 60 days of that expiration date to obtain a PA renewal.

## 2024-04-23 NOTE — TELEPHONE ENCOUNTER
Medication  Semaglutide-Weight Management (WEGOVY) 0.5 MG/0.5ML [[id number]]  Semaglutide-Weight Management (WEGOVY) 0.5 MG/0.5ML [[id number]]    Order Details  Dose: 0.5 mg Route: Subcutaneous Frequency: Weekly   Dispense Quantity: 2 mL Refills: 0          Sig: Inject 0.5 mL (0.5 mg total) under the skin once a week         Start Date: 04/22/24 End Date: --   Written Date: 04/22/24 Expiration Date: 04/22/25       Associated Diagnoses: Obesity, Class I, BMI 30-34.9 [E66.9]   Providers    Authorizing Provider:  Sheree Hassan MD  [facility] [address] [place] [id number]  Phone: [phone]   Fax: [phone]  NAVJOT #: [id number]   NPI: [id number]        Ordering User: Sheree Hassan MD          Pharmacy    [facility] [address] [facility] [address]  Phone: [phone]  Fax: [phone]  NAVJOT #: [id number]

## 2024-05-15 ENCOUNTER — OFFICE VISIT (OUTPATIENT)
Dept: BARIATRICS | Facility: CLINIC | Age: 29
End: 2024-05-15
Payer: COMMERCIAL

## 2024-05-15 VITALS
RESPIRATION RATE: 16 BRPM | HEIGHT: 65 IN | HEART RATE: 92 BPM | WEIGHT: 199.2 LBS | SYSTOLIC BLOOD PRESSURE: 100 MMHG | DIASTOLIC BLOOD PRESSURE: 60 MMHG | BODY MASS INDEX: 33.19 KG/M2

## 2024-05-15 DIAGNOSIS — R79.89 ELEVATED TESTOSTERONE LEVEL: ICD-10-CM

## 2024-05-15 DIAGNOSIS — E66.09 CLASS 1 OBESITY DUE TO EXCESS CALORIES IN ADULT: ICD-10-CM

## 2024-05-15 PROBLEM — E66.811 CLASS 1 OBESITY DUE TO EXCESS CALORIES IN ADULT: Status: ACTIVE | Noted: 2024-05-15

## 2024-05-15 PROCEDURE — 99214 OFFICE O/P EST MOD 30 MIN: CPT | Performed by: FAMILY MEDICINE

## 2024-05-15 RX ORDER — ETONOGESTREL AND ETHINYL ESTRADIOL VAGINAL RING .015; .12 MG/D; MG/D
RING VAGINAL
COMMUNITY
Start: 2024-05-10

## 2024-05-15 NOTE — PROGRESS NOTES
Assessment/Plan:  Queenie was seen today for consult.    Diagnoses and all orders for this visit:  1. BMI 33.0-33.9,adult  Semaglutide-Weight Management (WEGOVY) 1 MG/0.5ML      2. Class 1 obesity due to excess calories in adult        3. Elevated testosterone level        On Wegovy 0.5mg finished the month no side effects, lost 5% weight  Increase dosage  Continues exercise routine  Continues low caloric diet     - Patient denies personal history of pancreatitis. Patient also denies personal and family history of medullary thyroid cancer and multiple endocrine neoplasia type 2 (MEN 2 tumor). Patient understands these major side effects and agrees to start the medication.  Contraception methods needed and encouraged, risk for  fetal harm discussed. Nuvaring  Medical agreement signed  Dysmenorrhea  Irregular menses  Started Nuvaring  Testosterone elevated   Features of PCOS     1/29/24  HbA1C 4.9  TSH 2.01  NORMAL RENAL AND LIIVER FC  Return in 3 mo    Subjective:   Chief Complaint   Patient presents with    Follow-up       Patient ID: Queenie Mendez  is a 28 y.o. female with excess weight/obesity here to pursue weight management.  Previous notes and records have been reviewed.        HPI  Wt Readings from Last 20 Encounters:   05/15/24 90.4 kg (199 lb 3.2 oz)   04/17/24 93.9 kg (207 lb)   04/10/24 93.9 kg (207 lb)   04/03/24 94.3 kg (207 lb 12.8 oz)   03/20/24 95.3 kg (210 lb)   03/06/24 95.8 kg (211 lb 3.2 oz)   02/28/24 96.3 kg (212 lb 3.2 oz)   02/21/24 97.3 kg (214 lb 9.6 oz)   02/15/24 98 kg (216 lb)   02/06/24 98.2 kg (216 lb 6.4 oz)   06/08/23 95.7 kg (211 lb)   06/23/21 92.5 kg (204 lb)   06/17/21 91.4 kg (201 lb 6.4 oz)   10/27/20 90.1 kg (198 lb 9.6 oz)   01/10/20 94.6 kg (208 lb 9.6 oz)   12/29/19 93 kg (205 lb)   12/29/19 96.2 kg (212 lb 1.3 oz)   10/25/19 92.2 kg (203 lb 3.2 oz)   07/19/19 86.2 kg (190 lb)   10/19/18 92.3 kg (203 lb 6.4 oz)   Initial 214lbs  Current 199lbs lost 5%  Has 3 meals per day  "cut out starches  Hydration:64 oz water 1 cup coffee  Alcohol: cut out   Smoking:no  Exercise:45 min cardio daily beach body   Occupation:works for pharmaceutical and studies in nursing program  Sleep:ok  STOP ban/8    History reviewed. No pertinent past medical history.  Past Surgical History:   Procedure Laterality Date    IR DRAINAGE TUBE CHECK WITH SCLEROSIS  2021    IR DRAINAGE TUBE PLACEMENT  2021    IR DRAINAGE TUBE PLACEMENT WITH SCLEROSIS  2021    MOUTH SURGERY Bilateral     [place] teeth at age 6     The following portions of the patient's history were reviewed and updated as appropriate: allergies, current medications, past family history, past medical history, past social history, past surgical history, and problem list.    ROS:  Review of Systems   Constitutional: Negative for activity change. Fatigue  HENT: Negative for trouble swallowing.    Respiratory: Negative for shortness of breath.    Cardiovascular: Negative for chest pain, edema  Gastrointestinal: Negative for abdominal pain, nausea and vomiting, acid reflux, constipation/diarrhea  Endocrine: amenorrhea, headaches  Psychiatric/Behavioral: Negative for behavioral problems including anxiety /depression  Objective:  /60 (BP Location: Left arm, Patient Position: Sitting, Cuff Size: Large)   Pulse 92   Resp 16   Ht 5' 5\" (1.651 m)   Wt 90.4 kg (199 lb 3.2 oz)   BMI 33.15 kg/m²   Constitutional: Well-developed, well-nourished and Obese Body mass index is 33.15 kg/m².. Alert, cooperative.  HEENT: No conjunctival pallor or jaundice  Pulmonary: No increased work of breathing or signs of respiratory distress.  CV: Well-perfused, Normal rate    Vascular: no peripheral edema  GI: Abdomen obese, Non-distended  MSK: no sarcopenia noted   Neuro: Oriented to person, place and time. Normal Speech  Psych: Normal affect and mood. Normal thought process, no delusions      Labs and Imaging  Recent labs and imaging have been personally " reviewed.  Lab Results   Component Value Date    WBC 12.54 (H) 12/29/2019    HGB 14.4 12/29/2019    HCT 43.5 12/29/2019    MCV 92 12/29/2019     (H) 12/29/2019     Lab Results   Component Value Date     03/16/2014    SODIUM 143 12/29/2019    K 4.5 12/29/2019     12/29/2019    CO2 25 12/29/2019    ANIONGAP 5 03/16/2014    AGAP 12 12/29/2019    BUN 13 12/29/2019    CREATININE 0.76 12/29/2019    GLUC 72 12/29/2019    GLUF 83 04/02/2019    CALCIUM 9.5 12/29/2019    AST 22 12/29/2019    ALT 25 12/29/2019    ALKPHOS 58 12/29/2019    TP 8.4 (H) 12/29/2019    TBILI 0.20 12/29/2019    EGFR 112 12/29/2019     Lab Results   Component Value Date    HGBA1C 5.3 04/02/2019     Lab Results   Component Value Date    KQI0DAHCTEUL 2.220 04/02/2019     Lab Results   Component Value Date    CHOLESTEROL 144 04/02/2019     Lab Results   Component Value Date    HDL 47 04/02/2019     Lab Results   Component Value Date    TRIG 65 04/02/2019     Lab Results   Component Value Date    LDLCALC 84 04/02/2019

## 2024-06-03 ENCOUNTER — TELEPHONE (OUTPATIENT)
Dept: BARIATRICS | Facility: CLINIC | Age: 29
End: 2024-06-03

## 2024-06-03 DIAGNOSIS — E66.09 CLASS 1 OBESITY DUE TO EXCESS CALORIES IN ADULT: Primary | ICD-10-CM

## 2024-06-26 DIAGNOSIS — E66.09 CLASS 1 OBESITY DUE TO EXCESS CALORIES IN ADULT: ICD-10-CM

## 2024-06-26 RX ORDER — SEMAGLUTIDE 1.7 MG/.75ML
INJECTION, SOLUTION SUBCUTANEOUS
Qty: 3 ML | Refills: 0 | Status: SHIPPED | OUTPATIENT
Start: 2024-06-26

## 2024-07-19 DIAGNOSIS — E66.09 CLASS 1 OBESITY DUE TO EXCESS CALORIES IN ADULT: ICD-10-CM

## 2024-07-22 RX ORDER — SEMAGLUTIDE 1.7 MG/.75ML
INJECTION, SOLUTION SUBCUTANEOUS
Qty: 3 ML | Refills: 0 | Status: SHIPPED | OUTPATIENT
Start: 2024-07-22

## 2024-09-10 ENCOUNTER — OFFICE VISIT (OUTPATIENT)
Dept: URGENT CARE | Facility: MEDICAL CENTER | Age: 29
End: 2024-09-10
Payer: COMMERCIAL

## 2024-09-10 VITALS
WEIGHT: 191 LBS | HEART RATE: 96 BPM | SYSTOLIC BLOOD PRESSURE: 116 MMHG | BODY MASS INDEX: 31.78 KG/M2 | TEMPERATURE: 97.2 F | RESPIRATION RATE: 18 BRPM | DIASTOLIC BLOOD PRESSURE: 70 MMHG | OXYGEN SATURATION: 97 %

## 2024-09-10 DIAGNOSIS — R68.89 FLU-LIKE SYMPTOMS: ICD-10-CM

## 2024-09-10 DIAGNOSIS — J02.9 SORE THROAT: Primary | ICD-10-CM

## 2024-09-10 LAB
S PYO AG THROAT QL: NEGATIVE
SARS-COV-2 AG UPPER RESP QL IA: NEGATIVE
VALID CONTROL: NORMAL

## 2024-09-10 PROCEDURE — 87636 SARSCOV2 & INF A&B AMP PRB: CPT | Performed by: PHYSICIAN ASSISTANT

## 2024-09-10 PROCEDURE — 87880 STREP A ASSAY W/OPTIC: CPT | Performed by: PHYSICIAN ASSISTANT

## 2024-09-10 PROCEDURE — 99213 OFFICE O/P EST LOW 20 MIN: CPT | Performed by: PHYSICIAN ASSISTANT

## 2024-09-10 PROCEDURE — 87811 SARS-COV-2 COVID19 W/OPTIC: CPT | Performed by: PHYSICIAN ASSISTANT

## 2024-09-10 PROCEDURE — 87070 CULTURE OTHR SPECIMN AEROBIC: CPT | Performed by: PHYSICIAN ASSISTANT

## 2024-09-10 RX ORDER — OSELTAMIVIR PHOSPHATE 75 MG/1
75 CAPSULE ORAL 2 TIMES DAILY
Qty: 10 CAPSULE | Refills: 0 | Status: SHIPPED | OUTPATIENT
Start: 2024-09-10 | End: 2024-09-15

## 2024-09-10 NOTE — LETTER
September 10, 2024     Patient: Queenie Mendez   YOB: 1995   Date of Visit: 9/10/2024       To Whom it May Concern:    Queenie Mendez was seen in my clinic on 9/10/2024. She may return to school on 9/12/2024 .    If you have any questions or concerns, please don't hesitate to call.         Sincerely,          Zachary Olguin PA-C        CC: No Recipients

## 2024-09-10 NOTE — PATIENT INSTRUCTIONS
Flu like symptoms  Tamiflu as directed  Pharyngitis  Salt water gargles  Follow up with PCP in 3-5 days.  Proceed to  ER if symptoms worsen.

## 2024-09-10 NOTE — PROGRESS NOTES
[facility]        NAME: Queenie Mendez is a 29 y.o. female  : 1995    MRN: [id number]  DATE: September 10, 2024  TIME: 8:45 AM    Assessment and Plan   Flu-like symptoms [R68.89]  1. Flu-like symptoms  Covid/Flu-Office Collect    oseltamivir (TAMIFLU) 75 mg capsule      2. Sore throat  POCT rapid ANTIGEN strepA    Poct Covid 19 Rapid Antigen Test            Patient Instructions     Flu like symptoms  Tamiflu as directed  Pharyngitis  Salt water gargles  Follow up with PCP in 3-5 days.  Proceed to  ER if symptoms worsen.    Chief Complaint     Chief Complaint   Patient presents with    Sore Throat     Pt. With sore throat and fever that began 2 days ago. Also has a cough that began today.          History of Present Illness       29-year-old female who presents complaining of sore throat, pain on swallowing, subjective fevers, chills x 2 days.  States she has been using over-the-counter medication for symptom relief.  Denies chest pain, shortness of breath.    Sore Throat   Associated symptoms include coughing.       Review of Systems   Review of Systems   HENT:  Positive for sore throat.    Respiratory:  Positive for cough.          Current Medications       Current Outpatient Medications:     etonogestrel-ethinyl estradiol (NUVARING) 0.12-0.015 MG/24HR vaginal ring, INSERT ONE VAGINAL RING VAGINALLY EVERY 4 WEEKS; LEAVE IN PLACE FOR 3 WEEKS OF A 4-WEEK CYCLE, Disp: , Rfl:     oseltamivir (TAMIFLU) 75 mg capsule, Take 1 capsule (75 mg total) by mouth 2 (two) times a day for 5 days, Disp: 10 capsule, Rfl: 0    Wegovy 1.7 MG/0.75ML, INJECT 0.75 ML (1.7 MG TOTAL) UNDER THE SKIN ONCE A WEEK, Disp: 3 mL, Rfl: 0    norethindrone (AYGESTIN) 5 mg tablet, , Disp: , Rfl:     Current Allergies     Allergies as of 09/10/2024 - Reviewed 09/10/2024   Allergen Reaction Noted    Penicillins Rash 2017            The following portions of the patient's history were reviewed and updated as appropriate:  allergies, current medications, past family history, past medical history, past social history, past surgical history and problem list.     History reviewed. No pertinent past medical history.    Past Surgical History:   Procedure Laterality Date    IR DRAINAGE TUBE CHECK WITH SCLEROSIS  7/19/2021    IR DRAINAGE TUBE PLACEMENT  6/23/2021    IR DRAINAGE TUBE PLACEMENT WITH SCLEROSIS  7/7/2021    MOUTH SURGERY Bilateral     [place] teeth at age 6       Family History   Problem Relation Age of Onset    Breast cancer Mother     Anemia Family     Other Family         Blood Dyscrasia    Heart disease Family         Cardiac Disorder         Medications have been verified.        Objective   /70   Pulse 96   Temp (!) 97.2 °F (36.2 °C)   Resp 18   Wt 86.6 kg (191 lb)   SpO2 97%   BMI 31.78 kg/m²        Physical Exam     Physical Exam  Constitutional:       General: She is not in acute distress.     Appearance: She is well-developed. She is not diaphoretic.   HENT:      Head: Normocephalic and atraumatic.      Right Ear: Hearing, tympanic membrane, ear canal and external ear normal. No drainage, swelling or tenderness. No middle ear effusion. Tympanic membrane is not erythematous.      Left Ear: Hearing, tympanic membrane, ear canal and external ear normal. No drainage, swelling or tenderness.  No middle ear effusion. Tympanic membrane is not erythematous.      Nose: Rhinorrhea present. No congestion.      Mouth/Throat:      Mouth: Mucous membranes are normal. Mucous membranes are moist. No oral lesions.      Pharynx: Uvula midline. Posterior oropharyngeal erythema present. No oropharyngeal exudate.   Cardiovascular:      Rate and Rhythm: Normal rate and regular rhythm.      Heart sounds: Normal heart sounds.   Pulmonary:      Effort: Pulmonary effort is normal.      Breath sounds: Normal breath sounds.   Musculoskeletal:      Cervical back: Normal range of motion and neck supple.   Lymphadenopathy:      Cervical:  Cervical adenopathy present.

## 2024-09-11 LAB
FLUAV RNA RESP QL NAA+PROBE: NEGATIVE
FLUBV RNA RESP QL NAA+PROBE: NEGATIVE
SARS-COV-2 RNA RESP QL NAA+PROBE: NEGATIVE

## 2024-09-12 LAB — BACTERIA THROAT CULT: NORMAL

## 2025-02-18 ENCOUNTER — TRANSCRIBE ORDERS (OUTPATIENT)
Dept: URGENT CARE | Facility: MEDICAL CENTER | Age: 30
End: 2025-02-18

## 2025-02-18 ENCOUNTER — APPOINTMENT (OUTPATIENT)
Dept: LAB | Facility: MEDICAL CENTER | Age: 30
End: 2025-02-18

## 2025-02-18 ENCOUNTER — APPOINTMENT (OUTPATIENT)
Dept: URGENT CARE | Facility: MEDICAL CENTER | Age: 30
End: 2025-02-18

## 2025-02-18 DIAGNOSIS — Z02.1 PHYSICAL EXAM, PRE-EMPLOYMENT: Primary | ICD-10-CM

## 2025-02-18 DIAGNOSIS — Z02.1 PHYSICAL EXAM, PRE-EMPLOYMENT: ICD-10-CM

## 2025-02-18 LAB — RUBV IGG SERPL IA-ACNC: 16.6 IU/ML

## 2025-02-18 PROCEDURE — 86480 TB TEST CELL IMMUN MEASURE: CPT

## 2025-02-18 PROCEDURE — 86762 RUBELLA ANTIBODY: CPT

## 2025-02-18 PROCEDURE — 36415 COLL VENOUS BLD VENIPUNCTURE: CPT

## 2025-02-18 PROCEDURE — 86765 RUBEOLA ANTIBODY: CPT

## 2025-02-18 PROCEDURE — 86735 MUMPS ANTIBODY: CPT

## 2025-02-18 PROCEDURE — 86787 VARICELLA-ZOSTER ANTIBODY: CPT

## 2025-02-19 LAB
GAMMA INTERFERON BACKGROUND BLD IA-ACNC: 0.04 IU/ML
M TB IFN-G BLD-IMP: NEGATIVE
M TB IFN-G CD4+ BCKGRND COR BLD-ACNC: -0.01 IU/ML
M TB IFN-G CD4+ BCKGRND COR BLD-ACNC: 0.02 IU/ML
MEV IGG SER QL IA: 138 AU/ML
MEV IGG SER QL IA: POSITIVE
MITOGEN IGNF BCKGRD COR BLD-ACNC: 9.96 IU/ML
MUV IGG SER QL IA: 16.7 AU/ML
MUV IGG SER QL IA: POSITIVE
VZV IGG SER QL IA: 14.6 S/CO
VZV IGG SER QL IA: POSITIVE

## 2025-02-21 ENCOUNTER — OFFICE VISIT (OUTPATIENT)
Dept: BARIATRICS | Facility: CLINIC | Age: 30
End: 2025-02-21

## 2025-02-21 ENCOUNTER — TELEPHONE (OUTPATIENT)
Dept: BARIATRICS | Facility: CLINIC | Age: 30
End: 2025-02-21

## 2025-02-21 DIAGNOSIS — R63.5 ABNORMAL WEIGHT GAIN: ICD-10-CM

## 2025-02-21 DIAGNOSIS — Z83.438 FAMILY HISTORY OF ELEVATED BLOOD LIPIDS: ICD-10-CM

## 2025-02-21 DIAGNOSIS — E66.811 OBESITY, CLASS I, BMI 30-34.9: Primary | ICD-10-CM

## 2025-02-21 NOTE — PROGRESS NOTES
Assessment/Plan:     Obesity, Class I, BMI 30-34.9  - Discussed options of HealthyCORE-Intensive Lifestyle Intervention Program, Very Low Calorie Diet-VLCD, and Conservative Program and the role of weight loss medications.  - Patient is interested in pursuing HealthyCORE-Intensive Lifestyle Intervention Program and follow up visits with medical weight management provider.  - Explained the importance of making lifestyle changes in addition to starting any anti-obesity medications.   - Initial weight loss goal of 5-10% weight loss for improved health. Weight loss can improve patient's co-morbid conditions and/or prevent weight-related complications.  - Weight is not at goal and patient has been unable to achieve a meaningful weight loss above 5% using various programs and tools for more than 6 months  - Labs reviewed.     General Recommendations:  Nutrition:  Eat breakfast daily.  Do not skip meals.      Food log (ie.) www.[employer] Delivery Service (HDS).com, sparkpeople.com, loseit.com, WHObyYOU.com, etc.     Practice mindful eating.  Be sure to set aside time to eat, eat slowly, and savor your food.     Hydration:    At least 64oz of water daily.  No sugar sweetened beverages.  No juice (eat the fruit instead).     Exercise:  Studies have shown that the ideal exercise goal is somewhere between 150 to 300 minutes of moderate intensity exercise a week.  Start with exercising 10 minutes every other day and gradually increase physical activity with a goal of at least 150 minutes of moderate intensity exercise a week, divided over at least 3 days a week.  An example of this would be exercising 30 minutes a day, 5 days a week.  Resistance training can increase muscle mass and increase our resting metabolic rate.   FULL BODY resistance training is recommended 2-3 times a week.  Do not do this on consecutive days to allow for muscle recovery.     Aim for a bare minimum 5000 steps, even on days you do not exercise.     Monitoring:   Weigh  yourself daily.  If this causes undue stress, then just weigh yourself once a week.  Weigh yourself the same time of the day with the same amount of clothing on.  Preferably this should be done after waking up, before you eat, and with no clothing or minimal clothing on.    Wegovy Instructions:    - Begin Wegovy 0.25 mg subcutaneously once a week. Dose changes may occur after 4 doses if medication is tolerated. You will be assessed prior to each dose change to make sure you are tolerating the medication well.  - Please message me when you have 2 pens left from the prescription so there are no lapses in treatment.  - If you have been off the medication for more than 14 days please contact the office as you will need to restart the titration at the starting dose again to avoid significant side effects or adverse events.  - Visit wegovy.com for further information/injection instructions.   -Please eat small frequent meals to help reduce nausea. Lemon water and saltine crackers may help with this.   - Side effects of Wegovy discussed: nausea, vomiting, diarrhea, and constipation. If you experience fever, nausea/vomiting, and pain radiating to your back this may be a sign of pancreatitis. Please go to the emergency room if this occurs.  - Patient understands the side effects of the medication and proper administration. Patient agrees with the treatment plan and all questions were answered.    - Side effects of Wegovy: nausea, vomiting, diarrhea, and constipation. If severe abdominal pain develops, stop Wegovy and go to the ER, as this could be pancreatitis. Monitor heart rate while on Wegovy and if resting heart rate greater than 100 beats per minute, patient will notify me.   - If you need to have surgery or another procedure, such as an upper endoscopy or colonoscopy, please contact my office as often medications like Wegovy need to be held for a certain amount of time prior to a procedure.       GLP-1 Managing Side  Effects Tips:  - focus on small frequent meals  - moderate sugar and fat intake  - change the injection site (abdomen --> thigh--> back of arm)  - eat protein, crackers, mints and shira-based drinks  - nighttime injections  - drink plenty of water  - consider fiber supplements like miralax     Tips for Nausea:  - Don't drink and eat simultaneously: separate fluids 30-60 minutes before and after meals when experiencing nausea or if you notice you become full quickly  - Choose mild smelling foods- strong smells can exacerbate nausea  - Shira and peppermint- consider drinking shira or peppermint tea, which can help alleviate symptoms  - Eat- don't skip meals, if you have nausea, it is understandable that you may not feel like eating. However, skipping meals is generally not recommended as this can lead to lower blood sugar and fatigue. Furthermore, it is essential to consume adequate protein during weight loss. You can opt for a protein shake, a meal replacement supplement, bone broth or soup.      Tips for Constipation:   - Drink plenty of water  - Eat food with a high fiber content: increase your fiber intake by consuming these types of foods:              Eat more whole grain products like barley, oats, farro, brown or wild rice and quinoa.               Look for choices with 100% whole wheat, rye, oats or bran as the first ingredient listed               Check nutrition fact labels and choose products with 4gm of dietary fiber or more per serving              Add more beans to your diet              Eat more fresh fruits and vegetables with skins on them               Exercise- increase physical activity as it helps stimulate gastric mobility, which moves stool through the digestive tract     Patient denies personal history of pancreatitis. Patient also denies personal and family history of medullary thyroid cancer and multiple endocrine neoplasia type 2 (MEN 2 tumor).     Patient understands the side effects of  the medication and proper administration. Patient agrees with the treatment plan and all questions were answered.               Queenie was seen today for follow-up.    Diagnoses and all orders for this visit:    Obesity, Class I, BMI 30-34.9  -     Semaglutide-Weight Management (WEGOVY) 0.25 MG/0.5ML; Inject 0.5 mL (0.25 mg total) under the skin once a week for 28 days    Abnormal weight gain  -     Semaglutide-Weight Management (WEGOVY) 0.25 MG/0.5ML; Inject 0.5 mL (0.25 mg total) under the skin once a week for 28 days    Family history of elevated blood lipids  -     Semaglutide-Weight Management (WEGOVY) 0.25 MG/0.5ML; Inject 0.5 mL (0.25 mg total) under the skin once a week for 28 days        Total time spent reviewing chart, interviewing patient, examining patient, discussing plan, answering all questions, and documentin minutes with >50% face-to-face time with the patient.    Follow up in approximately 3 months with Non-Surgical Physician/Advanced Practitioner.    Subjective:   Chief Complaint   Patient presents with    Follow-up       Patient ID: Queenie Mendez  is a 29 y.o. female with excess weight/obesity here to pursue weight management.  Patient is pursuing Conservative Program. Patient previously on Wegovy with Dr. Hassan, but stopped taking as she was unable to follow-up with provider.   Most recent notes and records were reviewed.    HPI    Wt Readings from Last 20 Encounters:   09/10/24 86.6 kg (191 lb)   05/15/24 90.4 kg (199 lb 3.2 oz)   24 93.9 kg (207 lb)   04/10/24 93.9 kg (207 lb)   24 94.3 kg (207 lb 12.8 oz)   24 95.3 kg (210 lb)   24 95.8 kg (211 lb 3.2 oz)   24 96.3 kg (212 lb 3.2 oz)   24 97.3 kg (214 lb 9.6 oz)   02/15/24 98 kg (216 lb)   24 98.2 kg (216 lb 6.4 oz)   23 95.7 kg (211 lb)   21 92.5 kg (204 lb)   21 91.4 kg (201 lb 6.4 oz)   10/27/20 90.1 kg (198 lb 9.6 oz)   01/10/20 94.6 kg (208 lb 9.6 oz)   19 93 kg  (205 lb)   12/29/19 96.2 kg (212 lb 1.3 oz)   10/25/19 92.2 kg (203 lb 3.2 oz)   07/19/19 86.2 kg (190 lb)       Patient presents today to medical weight management office for follow up.    Weight loss medication and dose: was previously on Wegovy 1.7 over the Summer  Started weight and date: 216 lbs (2/6/2024)  Current weight: 191 lbs (2/21/2025)  Difference: -25 lbs   Goal Weight: 165-170 lbs     Starting BMI: 36.01 (2/06/2024)  Current BMI: 31.78 (2/21/2025)    Waist Measurements:      B- oatmeal, sometimes skips   L- healthy choice meals   D- chicken, veggie  S- yogurt sometimes  Take out frequency:once every other    Hydration- water  Alcohol- None  Exercise- pilates, light weights;         The following portions of the patient's history were reviewed and updated as appropriate: allergies, current medications, past family history, past medical history, past social history, past surgical history, and problem list.    Family History   Problem Relation Age of Onset    Breast cancer Mother     Anemia Family     Other Family         Blood Dyscrasia    Heart disease Family         Cardiac Disorder        Review of Systems   Constitutional:  Negative for chills and fever.   HENT:  Negative for ear pain and sore throat.    Eyes:  Negative for pain and visual disturbance.   Respiratory:  Negative for cough and shortness of breath.    Cardiovascular:  Negative for chest pain and palpitations.   Gastrointestinal:  Negative for abdominal pain and vomiting.   Genitourinary:  Negative for dysuria and hematuria.   Musculoskeletal:  Negative for arthralgias and back pain.   Skin:  Negative for color change and rash.   Neurological:  Negative for seizures and syncope.   All other systems reviewed and are negative.      Objective:  There were no vitals taken for this visit.    Physical Exam  Constitutional:       General: She is not in acute distress.     Appearance: Normal appearance. She is obese. She is not ill-appearing,  toxic-appearing or diaphoretic.   HENT:      Head: Normocephalic and atraumatic.   Pulmonary:      Effort: Pulmonary effort is normal.   Musculoskeletal:         General: Normal range of motion.      Cervical back: Normal range of motion.   Skin:     General: Skin is warm.   Neurological:      Mental Status: She is alert and oriented to person, place, and time.   Psychiatric:         Mood and Affect: Mood normal.         Behavior: Behavior normal.            Labs   Most recent labs reviewed   Lab Results   Component Value Date     03/16/2014    SODIUM 143 12/29/2019    K 4.5 12/29/2019     12/29/2019    CO2 25 12/29/2019    ANIONGAP 5 03/16/2014    AGAP 12 12/29/2019    BUN 13 12/29/2019    CREATININE 0.76 12/29/2019    GLUC 72 12/29/2019    GLUF 83 04/02/2019    CALCIUM 9.5 12/29/2019    AST 22 12/29/2019    ALT 25 12/29/2019    ALKPHOS 58 12/29/2019    TP 8.4 (H) 12/29/2019    TBILI 0.20 12/29/2019    EGFR 112 12/29/2019     Lab Results   Component Value Date    HGBA1C 5.3 04/02/2019     Lab Results   Component Value Date    LZJ8WKQKTHCO 2.220 04/02/2019     Lab Results   Component Value Date    CHOLESTEROL 144 04/02/2019     Lab Results   Component Value Date    HDL 47 04/02/2019     Lab Results   Component Value Date    TRIG 65 04/02/2019     Lab Results   Component Value Date    LDLCALC 84 04/02/2019           Weight Management  Lm Carrillo PA-C

## 2025-02-21 NOTE — PATIENT INSTRUCTIONS
- Discussed options of HealthyCORE-Intensive Lifestyle Intervention Program, Very Low Calorie Diet-VLCD, and Conservative Program and the role of weight loss medications.  - Patient is interested in pursuing HealthyCORE-Intensive Lifestyle Intervention Program and follow up visits with medical weight management provider.  - Explained the importance of making lifestyle changes in addition to starting any anti-obesity medications.   - Initial weight loss goal of 5-10% weight loss for improved health. Weight loss can improve patient's co-morbid conditions and/or prevent weight-related complications.  - Weight is not at goal and patient has been unable to achieve a meaningful weight loss above 5% using various programs and tools for more than 6 months  - Labs reviewed.     General Recommendations:  Nutrition:  Eat breakfast daily.  Do not skip meals.      Food log (ie.) www.myfitnesspal.com, sparkpeople.com, loseit.com, calorieking.com, etc.     Practice mindful eating.  Be sure to set aside time to eat, eat slowly, and savor your food.     Hydration:    At least 64oz of water daily.  No sugar sweetened beverages.  No juice (eat the fruit instead).     Exercise:  Studies have shown that the ideal exercise goal is somewhere between 150 to 300 minutes of moderate intensity exercise a week.  Start with exercising 10 minutes every other day and gradually increase physical activity with a goal of at least 150 minutes of moderate intensity exercise a week, divided over at least 3 days a week.  An example of this would be exercising 30 minutes a day, 5 days a week.  Resistance training can increase muscle mass and increase our resting metabolic rate.   FULL BODY resistance training is recommended 2-3 times a week.  Do not do this on consecutive days to allow for muscle recovery.     Aim for a bare minimum 5000 steps, even on days you do not exercise.     Monitoring:   Weigh yourself daily.  If this causes undue stress, then  just weigh yourself once a week.  Weigh yourself the same time of the day with the same amount of clothing on.  Preferably this should be done after waking up, before you eat, and with no clothing or minimal clothing on.    Wegovy Instructions:    - Begin Wegovy 0.25 mg subcutaneously once a week. Dose changes may occur after 4 doses if medication is tolerated. You will be assessed prior to each dose change to make sure you are tolerating the medication well.  - Please message me when you have 2 pens left from the prescription so there are no lapses in treatment.  - If you have been off the medication for more than 14 days please contact the office as you will need to restart the titration at the starting dose again to avoid significant side effects or adverse events.  - Visit wegovy.com for further information/injection instructions.   -Please eat small frequent meals to help reduce nausea. Lemon water and saltine crackers may help with this.   - Side effects of Wegovy discussed: nausea, vomiting, diarrhea, and constipation. If you experience fever, nausea/vomiting, and pain radiating to your back this may be a sign of pancreatitis. Please go to the emergency room if this occurs.  - Patient understands the side effects of the medication and proper administration. Patient agrees with the treatment plan and all questions were answered.    - Side effects of Wegovy: nausea, vomiting, diarrhea, and constipation. If severe abdominal pain develops, stop Wegovy and go to the ER, as this could be pancreatitis. Monitor heart rate while on Wegovy and if resting heart rate greater than 100 beats per minute, patient will notify me.   - If you need to have surgery or another procedure, such as an upper endoscopy or colonoscopy, please contact my office as often medications like Wegovy need to be held for a certain amount of time prior to a procedure.       GLP-1 Managing Side Effects Tips:  - focus on small frequent meals  -  moderate sugar and fat intake  - change the injection site (abdomen --> thigh--> back of arm)  - eat protein, crackers, mints and shira-based drinks  - nighttime injections  - drink plenty of water  - consider fiber supplements like miralax     Tips for Nausea:  - Don't drink and eat simultaneously: separate fluids 30-60 minutes before and after meals when experiencing nausea or if you notice you become full quickly  - Choose mild smelling foods- strong smells can exacerbate nausea  - Shira and peppermint- consider drinking shira or peppermint tea, which can help alleviate symptoms  - Eat- don't skip meals, if you have nausea, it is understandable that you may not feel like eating. However, skipping meals is generally not recommended as this can lead to lower blood sugar and fatigue. Furthermore, it is essential to consume adequate protein during weight loss. You can opt for a protein shake, a meal replacement supplement, bone broth or soup.      Tips for Constipation:   - Drink plenty of water  - Eat food with a high fiber content: increase your fiber intake by consuming these types of foods:              Eat more whole grain products like barley, oats, farro, brown or wild rice and quinoa.               Look for choices with 100% whole wheat, rye, oats or bran as the first ingredient listed               Check nutrition fact labels and choose products with 4gm of dietary fiber or more per serving              Add more beans to your diet              Eat more fresh fruits and vegetables with skins on them               Exercise- increase physical activity as it helps stimulate gastric mobility, which moves stool through the digestive tract     Patient denies personal history of pancreatitis. Patient also denies personal and family history of medullary thyroid cancer and multiple endocrine neoplasia type 2 (MEN 2 tumor).     Patient understands the side effects of the medication and proper administration. Patient  agrees with the treatment plan and all questions were answered.

## 2025-02-21 NOTE — TELEPHONE ENCOUNTER
Prior authorization Wegovy 0.25MG submitted through Cover My Meds.  Key# [id number].  Received message to contact member's plan [phone].

## 2025-02-21 NOTE — TELEPHONE ENCOUNTER
Contacted plan and patient is not a member of the plan.  Tried to do another submission on Cover My Meds through Dr. Tariff and again member isn't found.  Sent patient message via [employer] [facility] asking her to send us insurance & RX benefits information so we can proceed with the authorization.

## 2025-02-24 NOTE — TELEPHONE ENCOUNTER
Received insurance information from patient.  Contacted [employer] and spoke to Dominique (case ID#[id number]).  Wegovy Approved 1/25/25-9/22/25.  Pharmacy was notified and they're going to order in the medication for patient.  Patient co-pay $24.99.  Patient informed via [employer].

## 2025-03-04 ENCOUNTER — APPOINTMENT (OUTPATIENT)
Dept: LAB | Facility: MEDICAL CENTER | Age: 30
End: 2025-03-04
Payer: COMMERCIAL

## 2025-03-04 DIAGNOSIS — R73.01 IMPAIRED FASTING GLUCOSE: ICD-10-CM

## 2025-03-04 DIAGNOSIS — R63.4 LOSS OF WEIGHT: ICD-10-CM

## 2025-03-04 DIAGNOSIS — E61.1 IRON DEFICIENCY: ICD-10-CM

## 2025-03-04 DIAGNOSIS — E78.2 MIXED HYPERLIPIDEMIA: ICD-10-CM

## 2025-03-04 LAB — TSH SERPL DL<=0.05 MIU/L-ACNC: 1.61 UIU/ML (ref 0.45–4.5)

## 2025-03-04 PROCEDURE — 84443 ASSAY THYROID STIM HORMONE: CPT

## 2025-03-19 ENCOUNTER — PATIENT MESSAGE (OUTPATIENT)
Dept: BARIATRICS | Facility: CLINIC | Age: 30
End: 2025-03-19

## 2025-03-19 DIAGNOSIS — Z83.438 FAMILY HISTORY OF ELEVATED BLOOD LIPIDS: ICD-10-CM

## 2025-03-19 DIAGNOSIS — E66.811 OBESITY, CLASS I, BMI 30-34.9: ICD-10-CM

## 2025-03-19 DIAGNOSIS — R63.5 ABNORMAL WEIGHT GAIN: ICD-10-CM

## 2025-03-19 DIAGNOSIS — E66.811 CLASS 1 OBESITY DUE TO EXCESS CALORIES IN ADULT: Primary | ICD-10-CM

## 2025-03-19 DIAGNOSIS — E66.09 CLASS 1 OBESITY DUE TO EXCESS CALORIES IN ADULT: Primary | ICD-10-CM

## 2025-03-19 RX ORDER — SEMAGLUTIDE 0.25 MG/.5ML
INJECTION, SOLUTION SUBCUTANEOUS
Qty: 2 ML | Refills: 0 | OUTPATIENT
Start: 2025-03-19

## 2025-03-20 ENCOUNTER — TELEPHONE (OUTPATIENT)
Age: 30
End: 2025-03-20

## 2025-03-20 NOTE — TELEPHONE ENCOUNTER
Patient called seeking services and will call back to be added to the wait list once she receives her insurance card

## 2025-04-08 ENCOUNTER — TELEPHONE (OUTPATIENT)
Age: 30
End: 2025-04-08

## 2025-04-08 NOTE — TELEPHONE ENCOUNTER
Patient has been added to the Medication Management and Talk Therapy wait list without a referral.    Insurance: [facility]  Insurance Type:    Commercial [x]   Medicaid []   County (if applicable[place]   Medicare []  Location Preference: anywhere  Provider Preference: non  Virtual: Yes [] No [x]  Were outside resources sent: Yes [x] No []    Patient shared she is a [employer] employee.

## 2025-04-11 DIAGNOSIS — E66.811 OBESITY, CLASS I, BMI 30-34.9: ICD-10-CM

## 2025-04-11 DIAGNOSIS — E66.811 CLASS 1 OBESITY DUE TO EXCESS CALORIES IN ADULT: ICD-10-CM

## 2025-04-11 DIAGNOSIS — R63.5 ABNORMAL WEIGHT GAIN: ICD-10-CM

## 2025-04-11 DIAGNOSIS — E66.09 CLASS 1 OBESITY DUE TO EXCESS CALORIES IN ADULT: ICD-10-CM

## 2025-05-06 DIAGNOSIS — E66.811 OBESITY, CLASS I, BMI 30-34.9: Primary | ICD-10-CM

## 2025-05-06 DIAGNOSIS — E66.811 CLASS 1 OBESITY DUE TO EXCESS CALORIES IN ADULT: ICD-10-CM

## 2025-05-06 DIAGNOSIS — E66.09 CLASS 1 OBESITY DUE TO EXCESS CALORIES IN ADULT: ICD-10-CM

## 2025-05-06 DIAGNOSIS — R63.5 ABNORMAL WEIGHT GAIN: ICD-10-CM

## 2025-05-20 ENCOUNTER — TELEPHONE (OUTPATIENT)
Dept: BARIATRICS | Facility: CLINIC | Age: 30
End: 2025-05-20

## 2025-05-20 NOTE — TELEPHONE ENCOUNTER
Patient is due for next injection tomorrow.      Reason for call:   [x] Prior Auth  [] Other:     Caller:  [x] Patient  [] Pharmacy  [facility] pharmacy  Name:   Address:   Callback Number:     Medication: Semaglutide-Weight Management (WEGOVY) 1.7 MG/0.75ML     Dose/Frequency: 1.7 mg, Subcutaneous, Weekly     Quantity: 3 ml    Ordering Provider:   [] PCP/Provider -   [x] Speciality/Provider - weight man    Has the patient tried other medications and failed? If failed, which medications did they fail?    [] No   [] Yes -     Is the patient's insurance updated in EPIC?   Patient may have new insurance through [employer]  [] Yes   [] No     Is a copy of the patient's insurance scanned in EPIC?   [] Yes   [] No

## 2025-05-27 ENCOUNTER — TELEPHONE (OUTPATIENT)
Dept: BARIATRICS | Facility: CLINIC | Age: 30
End: 2025-05-27

## 2025-05-27 ENCOUNTER — TELEPHONE (OUTPATIENT)
Age: 30
End: 2025-05-27

## 2025-05-27 DIAGNOSIS — E66.811 OBESITY, CLASS I, BMI 30-34.9: ICD-10-CM

## 2025-05-27 DIAGNOSIS — R63.5 ABNORMAL WEIGHT GAIN: ICD-10-CM

## 2025-05-27 DIAGNOSIS — E66.09 CLASS 1 OBESITY DUE TO EXCESS CALORIES IN ADULT: ICD-10-CM

## 2025-05-27 DIAGNOSIS — E66.811 CLASS 1 OBESITY DUE TO EXCESS CALORIES IN ADULT: ICD-10-CM

## 2025-05-27 NOTE — TELEPHONE ENCOUNTER
Pt inquiring about the status of the PA for wegovy and the pt would also like her medication to be sent to [facility] in [place].

## 2025-05-29 ENCOUNTER — OFFICE VISIT (OUTPATIENT)
Dept: BARIATRICS | Facility: CLINIC | Age: 30
End: 2025-05-29

## 2025-05-29 VITALS
BODY MASS INDEX: 34.16 KG/M2 | HEIGHT: 65 IN | WEIGHT: 205 LBS | SYSTOLIC BLOOD PRESSURE: 110 MMHG | RESPIRATION RATE: 14 BRPM | DIASTOLIC BLOOD PRESSURE: 80 MMHG

## 2025-05-29 DIAGNOSIS — E66.811 OBESITY, CLASS I, BMI 30-34.9: Primary | ICD-10-CM

## 2025-05-29 NOTE — PATIENT INSTRUCTIONS
- Patient will start 1.7 mg  - Patient is pursuing Conservative Program and follow up visits with medical weight management provider  - Initial weight loss goal of 5-10% weight loss for improved health. Weight loss can improve patient's co-morbid conditions and/or prevent weight-related complications.  - Explained the importance of continuing lifestyle changes in addition to any anti-obesity medications.   - Labs reviewed.     General Recommendations:  Nutrition:  Eat breakfast daily.  Do not skip meals.      Food log (ie.) www.myfitnesspal.com, sparkpeople.com, loseit.com, calorieking.com, etc.     Practice mindful eating.  Be sure to set aside time to eat, eat slowly, and savor your food.     Hydration:    At least 64oz of water daily.  No sugar sweetened beverages.  No juice (eat the fruit instead).     Exercise:  Studies have shown that the ideal exercise goal is somewhere between 150 to 300 minutes of moderate intensity exercise a week.  Start with exercising 10 minutes every other day and gradually increase physical activity with a goal of at least 150 minutes of moderate intensity exercise a week, divided over at least 3 days a week.  An example of this would be exercising 30 minutes a day, 5 days a week.  Resistance training can increase muscle mass and increase our resting metabolic rate.   FULL BODY resistance training is recommended 2-3 times a week.  Do not do this on consecutive days to allow for muscle recovery.     Aim for a bare minimum 5000 steps, even on days you do not exercise.     Monitoring:   Weigh yourself daily.  If this causes undue stress, then just weigh yourself once a week.  Weigh yourself the same time of the day with the same amount of clothing on.  Preferably this should be done after waking up, before you eat, and with no clothing or minimal clothing on.     Specific Goals:  Calorie goal:  1200 shima/day (Provided with meal plan to follow)

## 2025-05-29 NOTE — PROGRESS NOTES
Assessment/Plan:     Obesity, Class I, BMI 30-34.9  - Patient will start 1.7 mg  - Patient is pursuing Conservative Program and follow up visits with medical weight management provider  - Initial weight loss goal of 5-10% weight loss for improved health. Weight loss can improve patient's co-morbid conditions and/or prevent weight-related complications.  - Explained the importance of continuing lifestyle changes in addition to any anti-obesity medications.   - Labs reviewed.     General Recommendations:  Nutrition:  Eat breakfast daily.  Do not skip meals.      Food log (ie.) www.Spottly.com, sparkpeople.com, loseit.com, calorieking.com, etc.     Practice mindful eating.  Be sure to set aside time to eat, eat slowly, and savor your food.     Hydration:    At least 64oz of water daily.  No sugar sweetened beverages.  No juice (eat the fruit instead).     Exercise:  Studies have shown that the ideal exercise goal is somewhere between 150 to 300 minutes of moderate intensity exercise a week.  Start with exercising 10 minutes every other day and gradually increase physical activity with a goal of at least 150 minutes of moderate intensity exercise a week, divided over at least 3 days a week.  An example of this would be exercising 30 minutes a day, 5 days a week.  Resistance training can increase muscle mass and increase our resting metabolic rate.   FULL BODY resistance training is recommended 2-3 times a week.  Do not do this on consecutive days to allow for muscle recovery.     Aim for a bare minimum 5000 steps, even on days you do not exercise.     Monitoring:   Weigh yourself daily.  If this causes undue stress, then just weigh yourself once a week.  Weigh yourself the same time of the day with the same amount of clothing on.  Preferably this should be done after waking up, before you eat, and with no clothing or minimal clothing on.     Specific Goals:  Calorie goal:  1200 shima/day (Provided with meal plan to  follow)         Queenie was seen today for follow-up.    Diagnoses and all orders for this visit:    Obesity, Class I, BMI 30-34.9        Total time spent reviewing chart, interviewing patient, examining patient, discussing plan, answering all questions, and documentin minutes with >50% face-to-face time with the patient.    Follow up in approximately 3 months with Non-Surgical Physician/Advanced Practitioner.    Subjective:   Chief Complaint   Patient presents with   • Follow-up       Patient ID: Queenie Mendez  is a 29 y.o. female with excess weight/obesity here to pursue weight management.  Patient is pursuing Conservative Program. Patient doing okay overall. Has gained some weight since last visit, but remains down since the start of Wegovy overall. Patient is due to start 1.7 mg dose, but there have been delays due to new insurance. Should hopefully start next dose today.   Most recent notes and records were reviewed.    HPI    Wt Readings from Last 20 Encounters:   25 93 kg (205 lb)   09/10/24 86.6 kg (191 lb)   05/15/24 90.4 kg (199 lb 3.2 oz)   24 93.9 kg (207 lb)   04/10/24 93.9 kg (207 lb)   24 94.3 kg (207 lb 12.8 oz)   24 95.3 kg (210 lb)   24 95.8 kg (211 lb 3.2 oz)   24 96.3 kg (212 lb 3.2 oz)   24 97.3 kg (214 lb 9.6 oz)   02/15/24 98 kg (216 lb)   24 98.2 kg (216 lb 6.4 oz)   23 95.7 kg (211 lb)   21 92.5 kg (204 lb)   21 91.4 kg (201 lb 6.4 oz)   10/27/20 90.1 kg (198 lb 9.6 oz)   01/10/20 94.6 kg (208 lb 9.6 oz)   19 93 kg (205 lb)   19 96.2 kg (212 lb 1.3 oz)   10/25/19 92.2 kg (203 lb 3.2 oz)       Patient presents today to medical weight management office for follow up.    Current Medication: Wegovy 1.7 mg  Started weight and date: 216 lbs (2024); 191 lbs (Last OV 2025)  Current weight: 205 lbs   Difference: -11 lbs       Starting BMI: 36.01 (2024)  Current BMI: 34.11 (2025)        B-  "oatmeal, sometimes skips   L- healthy choice meals   D- chicken, veggie  S- yogurt sometimes  Take out frequency:once every other     Hydration- water  Alcohol- None  Exercise- pilates, light weights;      The following portions of the patient's history were reviewed and updated as appropriate: allergies, current medications, past family history, past medical history, past social history, past surgical history, and problem list.    Family History[1]     Review of Systems   Constitutional:  Negative for chills and fever.   HENT:  Negative for ear pain and sore throat.    Eyes:  Negative for pain and visual disturbance.   Respiratory:  Negative for cough and shortness of breath.    Cardiovascular:  Negative for chest pain and palpitations.   Gastrointestinal:  Negative for abdominal pain and vomiting.   Genitourinary:  Negative for dysuria and hematuria.   Musculoskeletal:  Negative for arthralgias and back pain.   Skin:  Negative for color change and rash.   Neurological:  Negative for seizures and syncope.   All other systems reviewed and are negative.      Objective:  /80 (BP Location: Left arm, Patient Position: Sitting, Cuff Size: Large)   Resp 14   Ht 5' 5\" (1.651 m)   Wt 93 kg (205 lb)   BMI 34.11 kg/m²     Physical Exam  Constitutional:       General: She is not in acute distress.     Appearance: Normal appearance. She is obese. She is not ill-appearing, toxic-appearing or diaphoretic.   HENT:      Head: Normocephalic and atraumatic.   Pulmonary:      Effort: Pulmonary effort is normal.     Musculoskeletal:         General: Normal range of motion.      Cervical back: Normal range of motion.     Skin:     General: Skin is warm.     Neurological:      Mental Status: She is alert and oriented to person, place, and time.     Psychiatric:         Mood and Affect: Mood normal.         Behavior: Behavior normal.          Labs   Most recent labs reviewed   Lab Results   Component Value Date     " 03/16/2014    SODIUM 138 03/04/2025    K 4.3 03/04/2025     03/04/2025    CO2 28 03/04/2025    ANIONGAP 5 03/16/2014    AGAP 8 03/04/2025    BUN 12 03/04/2025    CREATININE 0.84 03/04/2025    GLUC 72 12/29/2019    GLUF 84 03/04/2025    CALCIUM 9.8 03/04/2025    AST 14 03/04/2025    ALT 14 03/04/2025    ALKPHOS 47 03/04/2025    TP 7.6 03/04/2025    TBILI 0.45 03/04/2025    EGFR 95 03/04/2025     Lab Results   Component Value Date    HGBA1C 5.0 03/04/2025     Lab Results   Component Value Date    EHF8WUKXTLKP 1.614 03/04/2025     Lab Results   Component Value Date    CHOLESTEROL 201 (H) 03/04/2025     Lab Results   Component Value Date    HDL 56 03/04/2025     Lab Results   Component Value Date    TRIG 85 03/04/2025     Lab Results   Component Value Date    LDLCALC 128 (H) 03/04/2025           Weight Management  Lm Carrillo PA-C           [1]  Family History  Problem Relation Name Age of Onset   • Breast cancer Mother     • Anemia Family Paternal Relatives    • Other Family Paternal Relatives         Blood Dyscrasia   • Heart disease Family Paternal Relatives         Cardiac Disorder

## 2025-06-03 ENCOUNTER — CLINICAL SUPPORT (OUTPATIENT)
Dept: BARIATRICS | Facility: CLINIC | Age: 30
End: 2025-06-03

## 2025-06-03 ENCOUNTER — APPOINTMENT (OUTPATIENT)
Dept: LAB | Facility: MEDICAL CENTER | Age: 30
End: 2025-06-03
Attending: PHYSICIAN ASSISTANT

## 2025-06-03 VITALS — WEIGHT: 205.4 LBS | HEIGHT: 65 IN | BODY MASS INDEX: 34.22 KG/M2

## 2025-06-03 DIAGNOSIS — R63.5 ABNORMAL WEIGHT GAIN: Primary | ICD-10-CM

## 2025-06-03 DIAGNOSIS — Z20.1 EXPOSURE TO TB: ICD-10-CM

## 2025-06-03 DIAGNOSIS — Z20.1 EXPOSURE TO TB: Primary | ICD-10-CM

## 2025-06-03 PROCEDURE — 86480 TB TEST CELL IMMUN MEASURE: CPT

## 2025-06-03 PROCEDURE — RECHECK

## 2025-06-03 PROCEDURE — 36415 COLL VENOUS BLD VENIPUNCTURE: CPT

## 2025-06-03 NOTE — PROGRESS NOTES
Weight Management Medical Nutrition Assessment  Queenie was here today as a new start in the Healthy Core Program.  Today she weighs 205.4 lbs and has a goal weight of 170 lbs.  She had completed the first 2 month of HC a year and a half ago but because  of work and school, she did not renew for month 3.  She had gained about 10 lbs back.  She decided that she wanted to start again rather than starting in month 3.  She is currently working night shift as a nurse.  She is on Wegovi.  Recently she started logging again which she admits has been very helpful.  Reviewed her calories carbs and protein needs.  She just started going to the gym again and meal prepping her food - all things that helped her the first time to lose weight.  She seems like she if off to a great start.        Patient seen by Medical Provider in past 6 months:  yes  Requested to schedule appointment with Medical Provider: No      Anthropometric Measurements  Start Weight (#) & Date: 205.4  (6/3/2025)  Current Weight (#): 205.4  TBW % Change from start weight:0%  Ideal Body Weight (#):125  Goal Weight (#):170  Highest:225  Lowest:150    Weight Loss History  Previous weight loss attempts: Healthy Core, beach body    Food and Nutrition Related History  Wake up: 2 - 4 pm on work days   Bed Time:8 am    Food Recall  Breakfast:chicken and sweet potato and taryn  (5pm when she gets up)    Snack:grapes and cheese or gold fish  Lunch:grazes at work (middle of the night)  Snack:none  Dinner 5 am over night oats   Snack:none      Beverages: coffee/tea, water  Volume of beverage intake: 64+ oz     Weekends: Same  Cravings: carbs  (pasta)  Trouble area of day:  during work shift    Frequency of Eating out: it was now she has cut back to once a week  Food restrictions:none  Cooking: self   Food Shopping: self    Physical Activity Intake  Activity:going to gym  Frequency:goal is 3 - 4 times  Physical limitations/barriers to exercise: none    Estimated  Needs  Energy    Lisa Steiner Energy Needs: BMR : 1668   1-2# loss weekly sedentary:  1001 - 62094             1-2# loss weekly lightly active:9999 - 5247  Maintenance calories for sedentary activity level: 2001  Protein:68 - 85      (1.2-1.5g/kg IBW)  Fluid Requirement Calculator   Total Fluid: 100.20   oz  (Montrose-Segar Method)  Free Fluid: 80.16 oz (Montrose-Segar Method - 20%)    Nutrition Diagnosis  Yes;    Overweight/obesity  related to Excess energy intake as evidenced by  BMI more than normative standard for age and sex (obesity-grade I 30-34.9)       Nutrition Intervention    Nutrition Prescription  Calories:1200 - 1400  Protein:68 - 85  Fluid:80    Nutrition Education:    Healthy Core Manual  Calorie controlled menu  Lean protein food choices  Healthy snack options  Food journaling tips      Nutrition Counseling:  Strategies: meal planning, portion sizes, healthy snack choices, hydration, fiber intake, protein intake, exercise, food journal      Monitoring and Evaluation:  Evaluation criteria:  Energy Intake  Meet protein needs  Maintain adequate hydration  Monitor weekly weight  Meal planning/preparation  Food journal   Decreased portions at mealtimes and snacks  Physical activity     Barriers to learning:none  Readiness to change: Action:  (Changing behavior)  Comprehension: good  Expected Compliance: good

## 2025-06-04 LAB
GAMMA INTERFERON BACKGROUND BLD IA-ACNC: 0.04 IU/ML
M TB IFN-G BLD-IMP: NEGATIVE
M TB IFN-G CD4+ BCKGRND COR BLD-ACNC: 0 IU/ML
M TB IFN-G CD4+ BCKGRND COR BLD-ACNC: 0.01 IU/ML
MITOGEN IGNF BCKGRD COR BLD-ACNC: 9.96 IU/ML

## 2025-06-08 ENCOUNTER — OFFICE VISIT (OUTPATIENT)
Dept: URGENT CARE | Facility: MEDICAL CENTER | Age: 30
End: 2025-06-08
Payer: COMMERCIAL

## 2025-06-08 VITALS
RESPIRATION RATE: 20 BRPM | OXYGEN SATURATION: 98 % | DIASTOLIC BLOOD PRESSURE: 76 MMHG | TEMPERATURE: 98 F | HEART RATE: 90 BPM | SYSTOLIC BLOOD PRESSURE: 106 MMHG

## 2025-06-08 DIAGNOSIS — R05.1 ACUTE COUGH: Primary | ICD-10-CM

## 2025-06-08 DIAGNOSIS — J34.89 SINUS PRESSURE: ICD-10-CM

## 2025-06-08 PROCEDURE — 99213 OFFICE O/P EST LOW 20 MIN: CPT | Performed by: PHYSICIAN ASSISTANT

## 2025-06-08 RX ORDER — AZITHROMYCIN 250 MG/1
TABLET, FILM COATED ORAL
Qty: 6 TABLET | Refills: 0 | Status: SHIPPED | OUTPATIENT
Start: 2025-06-08 | End: 2025-06-12

## 2025-06-08 RX ORDER — PREDNISONE 10 MG/1
40 TABLET ORAL DAILY
Qty: 16 TABLET | Refills: 0 | Status: SHIPPED | OUTPATIENT
Start: 2025-06-08 | End: 2025-06-12

## 2025-06-08 RX ORDER — BENZONATATE 100 MG/1
100 CAPSULE ORAL 3 TIMES DAILY PRN
Qty: 30 CAPSULE | Refills: 0 | Status: SHIPPED | OUTPATIENT
Start: 2025-06-08

## 2025-06-08 NOTE — PATIENT INSTRUCTIONS
"Patient Education     Cough in adults   The Basics   Written by the doctors and editors at [facility]   What is a cough? -- A cough is an important reflex that helps clear out the body's airways. The airways include the windpipe, or \"trachea,\" and the bronchi, which are the tubes that carry air within the lungs. Coughing helps keep people from breathing things into the airways and lungs, which could cause problems (figure 1).  It is normal for people to cough once in a while. But sometimes, a cough is a symptom of an illness or condition.  Some coughs are called \"dry\" coughs, because they don't bring up mucus (phlegm). Other coughs are called \"wet\" or \"productive\" coughs, because they do bring up mucus. Some coughs are mild and don't cause serious problems. Other coughs are severe and can cause trouble breathing.  What causes a cough? -- In adults, common causes of a cough include:   Viral infections - These include the common cold, the flu, and COVID-19. Usually, a cough caused by a viral infection will only last for a week or 2, but sometimes, it can last longer.   Smoking cigarettes or vaping   Postnasal drip - Postnasal drip is when mucus from the nose drips down or flows along the back of the throat. Postnasal drip can happen when people have:   A cold   Allergies   A sinus infection   Lung conditions - Lung problems like asthma and chronic obstructive pulmonary disease (\"COPD\") can make it hard to breathe. COPD is usually caused by smoking.   Acid reflux - Acid reflux is when the acid that is normally in your stomach backs up into your esophagus (the tube that carries food from your mouth to your stomach).   A side effect from blood pressure medicines called \"ACE inhibitors\"  Will I need tests? -- Maybe. If you see a doctor for your cough, they will talk with you and do an exam. Based on your symptoms and other factors, they might decide that you need tests. These might include:   A swab from your inside of your " "nose - This can be tested for the virus that causes COVID-19.   A chest X-ray   Breathing tests - Breathing tests involve breathing hard into a tube. These tests show how your lungs are working.   Allergy skin tests to find out what you're allergic to - For a skin test, the doctor puts a drop of the substance that you might be allergic to on your skin and makes a tiny prick in your skin. Then, they watch your skin to see if it gets red and bumpy.   A CT scan of your chest or sinuses - A CT scan is an imaging test that creates pictures of the inside of the body.   Lab tests on a sample of the mucus that you cough up   Using a \"scope\" to look inside of your nose, sinuses, airway, or lungs   Tests to check for acid reflux - These usually involve having a thin tube put in your mouth and down into your esophagus.  How can I care for myself at home?    If your cough is from a cold, you can use a cool mist humidifier in your bedroom.   Suck on cough drops or hard candy.   Drink warm liquids, like tea, to soothe your throat.   Avoid smoking and places where other people are smoking.   If you have allergies, avoid the things that you are allergic to. This might include pollen, dust, animals, or mold.   If you are coughing up mucus, try an over-the-counter cold and cough medicine. These medicines can thin mucus and sometimes reduce the urge to cough.   If you have acid reflux, your doctor or nurse will talk to you about how to reduce symptoms.  How is a cough treated? -- Treatment depends on the cause of your cough. For example:   Some infections are treated with antibiotic medicines. If an infection is caused by bacteria, doctors can treat it with antibiotics. If the infection is caused by a virus (such as the common cold), antibiotics will not help. For some viral infections, like the flu or COVID-19, there might be other medicines that can help.   Postnasal drip is treated with different kinds of medicines that can come as " "a pill or nose spray.   Asthma and COPD are usually treated with medicines that people breathe into their lungs. These are called \"inhaler medicines.\"   Acid reflux can be treated with medicine to reduce or block stomach acid.   If you have a cough as a side effect from an ACE inhibitor, your doctor can switch your medicine.  If the cause of your cough is not clear, your doctor might prescribe medicine to make your cough less severe. But these medicines have side effects, and doctors usually recommend them only if nothing else has worked.  When should I call the doctor? -- Call your doctor or nurse if:   You have trouble breathing or noisy breathing (wheezing).   You have a fever or chest pain.   You cough up blood, or yellow or green mucus.   You cough so hard that it makes you throw up.   Your cough gets worse or lasts longer than 14 days.   You have a cough and have lost weight without trying to.  All topics are updated as new evidence becomes available and our peer review process is complete.  This topic retrieved from [employer] on: Feb 26, 2024.  Topic 11521 Version 16.0  Release: 32.2.4 - C32.56  © 2024 UpToDate, Inc. and/or its affiliates. All rights reserved.  figure 1: Normal lungs     The lungs sit in the chest, inside the ribcage. They are covered with a thin membrane called the \"pleura.\" The windpipe, or trachea, branches into 2 smaller airways called the left and right \"bronchi.\" The space between the lungs is called the \"mediastinum.\" Lymph nodes are located within and around the lungs and mediastinum.  Graphic 42264 Version 14.0  Consumer Information Use and Disclaimer   Disclaimer: This generalized information is a limited summary of diagnosis, treatment, and/or medication information. It is not meant to be comprehensive and should be used as a tool to help the user understand and/or assess potential diagnostic and treatment options. It does NOT include all information about conditions, treatments, " medications, side effects, or risks that may apply to a specific patient. It is not intended to be medical advice or a substitute for the medical advice, diagnosis, or treatment of a health care provider based on the health care provider's examination and assessment of a patient's specific and unique circumstances. Patients must speak with a health care provider for complete information about their health, medical questions, and treatment options, including any risks or benefits regarding use of medications. This information does not endorse any treatments or medications as safe, effective, or approved for treating a specific patient. UpToDate, Inc. and its affiliates disclaim any warranty or liability relating to this information or the use thereof.The use of this information is governed by the Terms of Use, available at https://www.woltersSource4Styleuwer.com/en/know/clinical-effectiveness-terms. 2024© UpToDate, Inc. and its affiliates and/or licensors. All rights reserved.  Copyright   © 2024 UpToDate, Inc. and/or its affiliates. All rights reserved.
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
## 2025-06-08 NOTE — LETTER
June 8, 2025     Patient: Queenie Mendez  YOB: 1995  Date of Visit: 6/8/2025      To Whom it May Concern:    Queenie Mendez is under my professional care. Queenie was seen in my office on 6/8/2025. Queenie may return to work on 6/11/25.    If you have any questions or concerns, please don't hesitate to call.         Sincerely,          Eloise Wing PA-C        CC: No Recipients

## 2025-07-08 ENCOUNTER — TELEPHONE (OUTPATIENT)
Age: 30
End: 2025-07-08

## 2025-07-08 NOTE — TELEPHONE ENCOUNTER
Patient was last seen in 2020 and needs a new patient D & V -LMP 5/21/25. She would like [facility] office. Please advise.

## 2025-07-10 ENCOUNTER — TELEPHONE (OUTPATIENT)
Age: 30
End: 2025-07-10

## 2025-07-10 NOTE — TELEPHONE ENCOUNTER
Pt on [facility] for TT and MM services.   Called Pt to schedule potential appt for TT, and verify if Pt is interested in [facility] MM. No answer, lvm for a callback.

## 2025-07-16 ENCOUNTER — TELEPHONE (OUTPATIENT)
Age: 30
End: 2025-07-16
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
## 2025-07-17 ENCOUNTER — ULTRASOUND (OUTPATIENT)
Dept: OBGYN CLINIC | Facility: MEDICAL CENTER | Age: 30
End: 2025-07-17
Payer: COMMERCIAL

## 2025-07-17 VITALS
WEIGHT: 197 LBS | SYSTOLIC BLOOD PRESSURE: 110 MMHG | DIASTOLIC BLOOD PRESSURE: 64 MMHG | BODY MASS INDEX: 32.82 KG/M2 | HEIGHT: 65 IN

## 2025-07-17 DIAGNOSIS — Z3A.08 8 WEEKS GESTATION OF PREGNANCY: ICD-10-CM

## 2025-07-17 DIAGNOSIS — Z32.01 PREGNANCY TEST POSITIVE: Primary | ICD-10-CM

## 2025-07-17 PROCEDURE — 99203 OFFICE O/P NEW LOW 30 MIN: CPT | Performed by: CLINICAL NURSE SPECIALIST

## 2025-07-17 PROCEDURE — 76817 TRANSVAGINAL US OBSTETRIC: CPT | Performed by: CLINICAL NURSE SPECIALIST

## 2025-07-17 RX ORDER — ONDANSETRON 4 MG/1
TABLET, FILM COATED ORAL
COMMUNITY
Start: 2025-03-04

## 2025-07-17 NOTE — PROGRESS NOTES
Name: Queenie Mendez      : 1995      MRN: [id number]  Encounter Provider: ANA LUISA Valentine  Encounter Date: 2025   Encounter department: [facility]    :  Assessment & Plan  Pregnancy test positive    Orders:  •  Ambulatory Referral to Maternal Fetal Medicine; Future  •  AMB US OB < 14 weeks single or first gestation level 1    8 weeks gestation of pregnancy  She is a  with Patient's last menstrual period was 2025. Ultrasound today is showing a viable IUP that is c/w GA by LMP. NO change in EDC - 26.  Referral to [facility] given for routine US. F/U for OB intake and then Initial Prenatal Exam.    Orders:  •  Ambulatory Referral to Maternal Fetal Medicine; Future             History of Present Illness     Queenie Mendez is a 29 y.o. female. Here for Routine Prenatal Visit (LMP 25/CALE 26/8w1d/Patient has nausea and vomiting /Pregnancy was not prevented /Periods are regular /)      Newly Pregnant  Patient's last menstrual period was 2025. giving her an CALE of 26 and a gestational age of  8 weeks 1days (based on LMP)    Menstrual cycle: stopped contraception in May just prior to pregnancy.   Pregnancy was planned.   She has started taking a prenatal vitamin    She is C/O amenorrhea  Signs and symptoms of pregnancy: + N/V- was given zofran and asking for refills.    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  OB History    Para Term  AB Living   1        SAB IAB Ectopic Multiple Live Births             # Outcome Date GA Lbr Roberto/2nd Weight Sex Type Anes PTL Lv   1 Current                 The following portions of the patient's history were reviewed and updated as appropriate: allergies, current medications, past family history, past medical history, past social history, past surgical history, and problem list.    Perinent hx that may affect pregnancy:  Nulliparity  BMI 32        Review of Systems    See HPI for  "pertinent positives.             /64 (BP Location: Left arm, Patient Position: Sitting, Cuff Size: Standard)   Ht 5' 5\" (1.651 m)   Wt 89.4 kg (197 lb)   LMP 2025   BMI 32.78 kg/m²   OBGyn Exam      FIRST TRIMESTER OBSTETRIC ULTRASOUND     Patient's last menstrual period was 2025.    INDICATION: Establish Gestational Age       FINDINGS:  See imaging report for details     Additional Findings: none     FHR: 144  IMPRESSION:    Single intrauterine pregnancy of 7 weeks 3days gestational age  Fetal cardiac activity detected.  No adnexal masses seen.  EDC by LMP: 26  EDC by this Ultrasound: 3/2/26     Assigning a Final CALE  Please choose how you are assigning the CALE: The gestational age by LMP is </= 8w 6d and demonstrates 5 or fewer days difference from the gestational age by CRL, therefore the final CALE will be based on the LMP    Final CALE: 26 by LMP.    ANA LUISA Valentine   CENTER -[facility] [address]  [facility] [address]  Dept: [phone]  Dept Fax: [phone]  Loc: [phone]  Loc Fax: [phone]  Ultrasound Probe Disinfection    A transvaginal ultrasound was performed.   Prior to use, disinfection was performed with High Level Disinfection Process (Trophon).  Probe serial number: [id number] was used.                                "

## 2025-07-18 ENCOUNTER — TELEPHONE (OUTPATIENT)
Age: 30
End: 2025-07-18

## 2025-07-21 DIAGNOSIS — Z20.1 EXPOSURE TO TB: Primary | ICD-10-CM

## 2025-07-23 ENCOUNTER — TELEPHONE (OUTPATIENT)
Dept: PSYCHIATRY | Facility: CLINIC | Age: 30
End: 2025-07-23

## 2025-07-23 NOTE — TELEPHONE ENCOUNTER
One week follow up call for New Patient appointment with Key Mccarthy [[id number]] on 8/21/25  was made on 7/16/25. Writer informed patient of New Patient paperwork needing to be completed 5 days prior to the appointment. Writer confirmed paperwork has been sent via My Chart.

## 2025-08-05 ENCOUNTER — TELEPHONE (OUTPATIENT)
Dept: OBGYN CLINIC | Facility: CLINIC | Age: 30
End: 2025-08-05

## 2025-08-13 ENCOUNTER — ROUTINE PRENATAL (OUTPATIENT)
Facility: HOSPITAL | Age: 30
End: 2025-08-13
Attending: CLINICAL NURSE SPECIALIST
Payer: COMMERCIAL

## 2025-08-13 ENCOUNTER — ANCILLARY PROCEDURE (OUTPATIENT)
Facility: HOSPITAL | Age: 30
End: 2025-08-13
Attending: STUDENT IN AN ORGANIZED HEALTH CARE EDUCATION/TRAINING PROGRAM
Payer: COMMERCIAL

## 2025-08-13 DIAGNOSIS — Z3A.12 12 WEEKS GESTATION OF PREGNANCY: ICD-10-CM

## 2025-08-13 PROBLEM — N92.6 IRREGULAR MENSTRUAL CYCLE: Status: ACTIVE | Noted: 2017-10-27

## 2025-08-13 PROBLEM — T74.21XA SEXUAL ASSAULT OF ADULT: Status: RESOLVED | Noted: 2020-01-10 | Resolved: 2025-08-13

## 2025-08-13 PROBLEM — O99.210 OBESITY COMPLICATING PREGNANCY: Status: ACTIVE | Noted: 2025-08-13

## 2025-08-13 PROCEDURE — 76801 OB US < 14 WKS SINGLE FETUS: CPT | Performed by: OBSTETRICS & GYNECOLOGY

## 2025-08-13 PROCEDURE — NC001 PR NO CHARGE: Performed by: OBSTETRICS & GYNECOLOGY

## 2025-08-13 PROCEDURE — 76813 OB US NUCHAL MEAS 1 GEST: CPT | Performed by: OBSTETRICS & GYNECOLOGY

## 2025-08-22 ENCOUNTER — INITIAL PRENATAL (OUTPATIENT)
Dept: OBGYN CLINIC | Facility: CLINIC | Age: 30
End: 2025-08-22

## 2025-08-22 VITALS
DIASTOLIC BLOOD PRESSURE: 77 MMHG | HEIGHT: 65 IN | BODY MASS INDEX: 34.49 KG/M2 | SYSTOLIC BLOOD PRESSURE: 125 MMHG | WEIGHT: 207 LBS

## 2025-08-22 DIAGNOSIS — Z36.9 ENCOUNTER FOR ANTENATAL SCREENING: ICD-10-CM

## 2025-08-22 DIAGNOSIS — Z31.430 ENCOUNTER OF FEMALE FOR TESTING FOR GENETIC DISEASE CARRIER STATUS FOR PROCREATIVE MANAGEMENT: Primary | ICD-10-CM

## 2025-08-22 DIAGNOSIS — Z34.01 ENCOUNTER FOR SUPERVISION OF NORMAL FIRST PREGNANCY IN FIRST TRIMESTER: ICD-10-CM
